# Patient Record
Sex: MALE | Race: WHITE | NOT HISPANIC OR LATINO | Employment: FULL TIME | URBAN - METROPOLITAN AREA
[De-identification: names, ages, dates, MRNs, and addresses within clinical notes are randomized per-mention and may not be internally consistent; named-entity substitution may affect disease eponyms.]

---

## 2017-05-14 ENCOUNTER — HOSPITAL ENCOUNTER (EMERGENCY)
Facility: HOSPITAL | Age: 42
Discharge: HOME | End: 2017-05-14
Attending: EMERGENCY MEDICINE | Admitting: EMERGENCY MEDICINE
Payer: COMMERCIAL

## 2017-05-14 VITALS
SYSTOLIC BLOOD PRESSURE: 149 MMHG | HEIGHT: 66 IN | TEMPERATURE: 98.4 F | BODY MASS INDEX: 38.57 KG/M2 | WEIGHT: 240 LBS | HEART RATE: 74 BPM | DIASTOLIC BLOOD PRESSURE: 85 MMHG | OXYGEN SATURATION: 97 % | RESPIRATION RATE: 18 BRPM

## 2017-05-14 DIAGNOSIS — R31.9 HEMATURIA: Primary | ICD-10-CM

## 2017-05-14 DIAGNOSIS — R39.15 URINARY URGENCY: ICD-10-CM

## 2017-05-14 LAB
BACTERIA UR QL AUTO: ABNORMAL /HPF
BILIRUB UR QL STRIP: NEGATIVE
CLARITY UR: ABNORMAL
COLOR UR: ABNORMAL
GLUCOSE UR STRIP-MCNC: ABNORMAL MG/DL
HGB UR QL STRIP.AUTO: ABNORMAL
HYALINE CASTS #/AREA URNS LPF: ABNORMAL /LPF
KETONES UR STRIP-MCNC: ABNORMAL MG/DL
LEUKOCYTE ESTERASE UR QL STRIP: NEGATIVE
NITRITE UR QL STRIP: NEGATIVE
NON-SQ EPI CELLS URNS QL MICRO: ABNORMAL /HPF
PH UR STRIP.AUTO: 6 [PH] (ref 4.5–8)
PROT UR STRIP-MCNC: >=300 MG/DL
RBC #/AREA URNS AUTO: ABNORMAL /HPF
SP GR UR STRIP.AUTO: >=1.03 (ref 1–1.03)
UROBILINOGEN UR QL STRIP.AUTO: 0.2 E.U./DL
WBC #/AREA URNS AUTO: ABNORMAL /HPF

## 2017-05-14 PROCEDURE — 99283 EMERGENCY DEPT VISIT LOW MDM: CPT

## 2017-05-14 PROCEDURE — 81001 URINALYSIS AUTO W/SCOPE: CPT

## 2017-05-14 PROCEDURE — 87086 URINE CULTURE/COLONY COUNT: CPT

## 2017-05-14 RX ORDER — SIMVASTATIN 20 MG
20 TABLET ORAL
COMMUNITY

## 2017-05-14 RX ORDER — LISINOPRIL 10 MG/1
10 TABLET ORAL DAILY
COMMUNITY

## 2017-05-16 ENCOUNTER — TRANSCRIBE ORDERS (OUTPATIENT)
Dept: ADMINISTRATIVE | Facility: HOSPITAL | Age: 42
End: 2017-05-16

## 2017-05-16 DIAGNOSIS — R31.0 GROSS HEMATURIA: Primary | ICD-10-CM

## 2017-05-16 LAB — BACTERIA UR CULT: NORMAL

## 2017-05-24 ENCOUNTER — HOSPITAL ENCOUNTER (OUTPATIENT)
Dept: CT IMAGING | Facility: HOSPITAL | Age: 42
Discharge: HOME | End: 2017-05-24
Attending: UROLOGY
Payer: COMMERCIAL

## 2017-05-24 ENCOUNTER — OFFICE VISIT (OUTPATIENT)
Dept: LAB | Facility: CLINIC | Age: 42
End: 2017-05-24
Payer: COMMERCIAL

## 2017-05-24 ENCOUNTER — TRANSCRIBE ORDERS (OUTPATIENT)
Dept: LAB | Facility: CLINIC | Age: 42
End: 2017-05-24

## 2017-05-24 DIAGNOSIS — Z01.818 PREOP EXAMINATION: ICD-10-CM

## 2017-05-24 DIAGNOSIS — R31.0 GROSS HEMATURIA: ICD-10-CM

## 2017-05-24 DIAGNOSIS — Z01.818 PREOP EXAMINATION: Primary | ICD-10-CM

## 2017-05-24 PROCEDURE — 74178 CT ABD&PLV WO CNTR FLWD CNTR: CPT

## 2017-05-24 PROCEDURE — 93005 ELECTROCARDIOGRAM TRACING: CPT

## 2017-05-24 RX ADMIN — IOHEXOL 125 ML: 350 INJECTION, SOLUTION INTRAVENOUS at 07:29

## 2017-05-25 LAB
ATRIAL RATE: 75 BPM
P AXIS: 52 DEGREES
PR INTERVAL: 142 MS
QRS AXIS: -20 DEGREES
QRSD INTERVAL: 92 MS
QT INTERVAL: 394 MS
QTC INTERVAL: 439 MS
T WAVE AXIS: 49 DEGREES
VENTRICULAR RATE: 75 BPM

## 2017-05-31 ENCOUNTER — ANESTHESIA EVENT (OUTPATIENT)
Dept: PERIOP | Facility: HOSPITAL | Age: 42
End: 2017-05-31
Payer: COMMERCIAL

## 2017-06-01 ENCOUNTER — ANESTHESIA (OUTPATIENT)
Dept: PERIOP | Facility: HOSPITAL | Age: 42
End: 2017-06-01
Payer: COMMERCIAL

## 2017-06-01 ENCOUNTER — HOSPITAL ENCOUNTER (OUTPATIENT)
Facility: HOSPITAL | Age: 42
Setting detail: OUTPATIENT SURGERY
Discharge: HOME | End: 2017-06-01
Attending: UROLOGY | Admitting: UROLOGY
Payer: COMMERCIAL

## 2017-06-01 ENCOUNTER — APPOINTMENT (OUTPATIENT)
Dept: RADIOLOGY | Facility: HOSPITAL | Age: 42
End: 2017-06-01
Payer: COMMERCIAL

## 2017-06-01 VITALS
OXYGEN SATURATION: 94 % | BODY MASS INDEX: 35.77 KG/M2 | TEMPERATURE: 98.5 F | HEIGHT: 68 IN | HEART RATE: 79 BPM | WEIGHT: 236 LBS | SYSTOLIC BLOOD PRESSURE: 166 MMHG | DIASTOLIC BLOOD PRESSURE: 94 MMHG | RESPIRATION RATE: 16 BRPM

## 2017-06-01 DIAGNOSIS — R31.0 GROSS HEMATURIA: ICD-10-CM

## 2017-06-01 DIAGNOSIS — N35.919 URETHRAL STRICTURE: ICD-10-CM

## 2017-06-01 LAB
GLUCOSE SERPL-MCNC: 233 MG/DL (ref 65–140)
GLUCOSE SERPL-MCNC: 257 MG/DL (ref 65–140)
GLUCOSE SERPL-MCNC: 264 MG/DL (ref 65–140)

## 2017-06-01 PROCEDURE — 87077 CULTURE AEROBIC IDENTIFY: CPT | Performed by: UROLOGY

## 2017-06-01 PROCEDURE — 87086 URINE CULTURE/COLONY COUNT: CPT | Performed by: UROLOGY

## 2017-06-01 PROCEDURE — C1769 GUIDE WIRE: HCPCS | Performed by: UROLOGY

## 2017-06-01 PROCEDURE — 87186 SC STD MICRODIL/AGAR DIL: CPT | Performed by: UROLOGY

## 2017-06-01 PROCEDURE — 82948 REAGENT STRIP/BLOOD GLUCOSE: CPT

## 2017-06-01 PROCEDURE — 74420 UROGRAPHY RTRGR +-KUB: CPT

## 2017-06-01 RX ORDER — MIDAZOLAM HYDROCHLORIDE 1 MG/ML
INJECTION INTRAMUSCULAR; INTRAVENOUS
Status: DISCONTINUED
Start: 2017-06-01 | End: 2017-06-01 | Stop reason: HOSPADM

## 2017-06-01 RX ORDER — MIDAZOLAM HYDROCHLORIDE 1 MG/ML
INJECTION INTRAMUSCULAR; INTRAVENOUS AS NEEDED
Status: DISCONTINUED | OUTPATIENT
Start: 2017-06-01 | End: 2017-06-01 | Stop reason: SURG

## 2017-06-01 RX ORDER — IBUPROFEN 200 MG
1 TABLET ORAL 2 TIMES DAILY
Qty: 28.3 G | Refills: 0 | Status: SHIPPED | OUTPATIENT
Start: 2017-06-01 | End: 2018-09-18

## 2017-06-01 RX ORDER — LABETALOL HYDROCHLORIDE 5 MG/ML
INJECTION, SOLUTION INTRAVENOUS AS NEEDED
Status: DISCONTINUED | OUTPATIENT
Start: 2017-06-01 | End: 2017-06-01 | Stop reason: SURG

## 2017-06-01 RX ORDER — ONDANSETRON 2 MG/ML
INJECTION INTRAMUSCULAR; INTRAVENOUS AS NEEDED
Status: DISCONTINUED | OUTPATIENT
Start: 2017-06-01 | End: 2017-06-01 | Stop reason: SURG

## 2017-06-01 RX ORDER — FENTANYL CITRATE 50 UG/ML
INJECTION, SOLUTION INTRAMUSCULAR; INTRAVENOUS AS NEEDED
Status: DISCONTINUED | OUTPATIENT
Start: 2017-06-01 | End: 2017-06-01 | Stop reason: SURG

## 2017-06-01 RX ORDER — SODIUM CHLORIDE, SODIUM LACTATE, POTASSIUM CHLORIDE, CALCIUM CHLORIDE 600; 310; 30; 20 MG/100ML; MG/100ML; MG/100ML; MG/100ML
20 INJECTION, SOLUTION INTRAVENOUS CONTINUOUS
Status: DISCONTINUED | OUTPATIENT
Start: 2017-06-01 | End: 2017-06-01 | Stop reason: HOSPADM

## 2017-06-01 RX ORDER — PROPOFOL 10 MG/ML
INJECTION, EMULSION INTRAVENOUS AS NEEDED
Status: DISCONTINUED | OUTPATIENT
Start: 2017-06-01 | End: 2017-06-01 | Stop reason: SURG

## 2017-06-01 RX ORDER — CEFUROXIME AXETIL 500 MG/1
500 TABLET ORAL EVERY 12 HOURS SCHEDULED
Qty: 6 TABLET | Refills: 0 | Status: SHIPPED | OUTPATIENT
Start: 2017-06-01 | End: 2017-06-04

## 2017-06-01 RX ORDER — FENTANYL CITRATE/PF 50 MCG/ML
50 SYRINGE (ML) INJECTION
Status: DISCONTINUED | OUTPATIENT
Start: 2017-06-01 | End: 2017-06-01 | Stop reason: HOSPADM

## 2017-06-01 RX ORDER — LIDOCAINE HYDROCHLORIDE 10 MG/ML
INJECTION, SOLUTION INFILTRATION; PERINEURAL AS NEEDED
Status: DISCONTINUED | OUTPATIENT
Start: 2017-06-01 | End: 2017-06-01 | Stop reason: SURG

## 2017-06-01 RX ORDER — SODIUM CHLORIDE, SODIUM LACTATE, POTASSIUM CHLORIDE, CALCIUM CHLORIDE 600; 310; 30; 20 MG/100ML; MG/100ML; MG/100ML; MG/100ML
125 INJECTION, SOLUTION INTRAVENOUS CONTINUOUS
Status: DISCONTINUED | OUTPATIENT
Start: 2017-06-01 | End: 2017-06-01 | Stop reason: HOSPADM

## 2017-06-01 RX ORDER — OXYCODONE HYDROCHLORIDE AND ACETAMINOPHEN 5; 325 MG/1; MG/1
1 TABLET ORAL EVERY 4 HOURS PRN
Qty: 12 TABLET | Refills: 0 | Status: SHIPPED | OUTPATIENT
Start: 2017-06-01 | End: 2017-06-06

## 2017-06-01 RX ORDER — ONDANSETRON 2 MG/ML
4 INJECTION INTRAMUSCULAR; INTRAVENOUS ONCE
Status: DISCONTINUED | OUTPATIENT
Start: 2017-06-01 | End: 2017-06-01 | Stop reason: HOSPADM

## 2017-06-01 RX ADMIN — FENTANYL CITRATE 50 MCG: 50 INJECTION, SOLUTION INTRAMUSCULAR; INTRAVENOUS at 12:12

## 2017-06-01 RX ADMIN — SODIUM CHLORIDE, SODIUM LACTATE, POTASSIUM CHLORIDE, AND CALCIUM CHLORIDE 20 ML/HR: .6; .31; .03; .02 INJECTION, SOLUTION INTRAVENOUS at 11:13

## 2017-06-01 RX ADMIN — FENTANYL CITRATE 50 MCG: 50 INJECTION, SOLUTION INTRAMUSCULAR; INTRAVENOUS at 12:20

## 2017-06-01 RX ADMIN — CEFAZOLIN SODIUM 2000 MG: 2 SOLUTION INTRAVENOUS at 12:08

## 2017-06-01 RX ADMIN — LABETALOL HYDROCHLORIDE 5 MG: 5 INJECTION, SOLUTION INTRAVENOUS at 12:26

## 2017-06-01 RX ADMIN — LIDOCAINE HYDROCHLORIDE 50 MG: 10 INJECTION, SOLUTION INFILTRATION; PERINEURAL at 12:12

## 2017-06-01 RX ADMIN — ONDANSETRON 4 MG: 2 INJECTION INTRAMUSCULAR; INTRAVENOUS at 12:20

## 2017-06-01 RX ADMIN — PROPOFOL 200 MG: 10 INJECTION, EMULSION INTRAVENOUS at 12:12

## 2017-06-01 RX ADMIN — MIDAZOLAM HYDROCHLORIDE 2 MG: 1 INJECTION, SOLUTION INTRAMUSCULAR; INTRAVENOUS at 12:06

## 2017-06-03 LAB
BACTERIA UR CULT: NORMAL
BACTERIA UR CULT: NORMAL
GRAM STN SPEC: NORMAL

## 2017-06-09 ENCOUNTER — HOSPITAL ENCOUNTER (EMERGENCY)
Facility: HOSPITAL | Age: 42
Discharge: HOME | End: 2017-06-10
Attending: EMERGENCY MEDICINE
Payer: COMMERCIAL

## 2017-06-09 ENCOUNTER — APPOINTMENT (EMERGENCY)
Dept: RADIOLOGY | Facility: HOSPITAL | Age: 42
End: 2017-06-09
Payer: COMMERCIAL

## 2017-06-09 VITALS
RESPIRATION RATE: 18 BRPM | DIASTOLIC BLOOD PRESSURE: 86 MMHG | OXYGEN SATURATION: 98 % | TEMPERATURE: 98.8 F | HEART RATE: 90 BPM | WEIGHT: 226.85 LBS | SYSTOLIC BLOOD PRESSURE: 132 MMHG | BODY MASS INDEX: 34.49 KG/M2

## 2017-06-09 DIAGNOSIS — R50.9 FEVER: ICD-10-CM

## 2017-06-09 DIAGNOSIS — N39.0 UTI (URINARY TRACT INFECTION): Primary | ICD-10-CM

## 2017-06-09 LAB
ALBUMIN SERPL BCP-MCNC: 3.9 G/DL (ref 3.5–5)
ALP SERPL-CCNC: 99 U/L (ref 46–116)
ALT SERPL W P-5'-P-CCNC: 27 U/L (ref 12–78)
ANION GAP SERPL CALCULATED.3IONS-SCNC: 8 MMOL/L (ref 4–13)
APTT PPP: 27 SECONDS (ref 23–35)
AST SERPL W P-5'-P-CCNC: 13 U/L (ref 5–45)
BACTERIA UR QL AUTO: ABNORMAL /HPF
BASOPHILS # BLD AUTO: 0.03 THOUSANDS/ΜL (ref 0–0.1)
BASOPHILS NFR BLD AUTO: 0 % (ref 0–1)
BILIRUB SERPL-MCNC: 0.4 MG/DL (ref 0.2–1)
BILIRUB UR QL STRIP: NEGATIVE
BUN SERPL-MCNC: 10 MG/DL (ref 5–25)
CALCIUM SERPL-MCNC: 8.7 MG/DL (ref 8.3–10.1)
CHLORIDE SERPL-SCNC: 100 MMOL/L (ref 100–108)
CLARITY UR: ABNORMAL
CO2 SERPL-SCNC: 27 MMOL/L (ref 21–32)
COLOR UR: YELLOW
CREAT SERPL-MCNC: 0.88 MG/DL (ref 0.6–1.3)
EOSINOPHIL # BLD AUTO: 0.19 THOUSAND/ΜL (ref 0–0.61)
EOSINOPHIL NFR BLD AUTO: 1 % (ref 0–6)
ERYTHROCYTE [DISTWIDTH] IN BLOOD BY AUTOMATED COUNT: 12.9 % (ref 11.6–15.1)
GFR SERPL CREATININE-BSD FRML MDRD: >60 ML/MIN/1.73SQ M
GLUCOSE SERPL-MCNC: 257 MG/DL (ref 65–140)
GLUCOSE UR STRIP-MCNC: NEGATIVE MG/DL
HCT VFR BLD AUTO: 43 % (ref 36.5–49.3)
HGB BLD-MCNC: 15.1 G/DL (ref 12–17)
HGB UR QL STRIP.AUTO: ABNORMAL
INR PPP: 0.96 (ref 0.86–1.16)
KETONES UR STRIP-MCNC: ABNORMAL MG/DL
LEUKOCYTE ESTERASE UR QL STRIP: ABNORMAL
LYMPHOCYTES # BLD AUTO: 1.92 THOUSANDS/ΜL (ref 0.6–4.47)
LYMPHOCYTES NFR BLD AUTO: 12 % (ref 14–44)
MCH RBC QN AUTO: 28.1 PG (ref 26.8–34.3)
MCHC RBC AUTO-ENTMCNC: 35.1 G/DL (ref 31.4–37.4)
MCV RBC AUTO: 80 FL (ref 82–98)
MONOCYTES # BLD AUTO: 1.22 THOUSAND/ΜL (ref 0.17–1.22)
MONOCYTES NFR BLD AUTO: 8 % (ref 4–12)
NEUTROPHILS # BLD AUTO: 12.79 THOUSANDS/ΜL (ref 1.85–7.62)
NEUTS SEG NFR BLD AUTO: 79 % (ref 43–75)
NITRITE UR QL STRIP: NEGATIVE
NON-SQ EPI CELLS URNS QL MICRO: ABNORMAL /HPF
OTHER STN SPEC: ABNORMAL
PH UR STRIP.AUTO: 5.5 [PH] (ref 4.5–8)
PLATELET # BLD AUTO: 214 THOUSANDS/UL (ref 149–390)
PMV BLD AUTO: 11.5 FL (ref 8.9–12.7)
POTASSIUM SERPL-SCNC: 4.4 MMOL/L (ref 3.5–5.3)
PROT SERPL-MCNC: 7.6 G/DL (ref 6.4–8.2)
PROT UR STRIP-MCNC: ABNORMAL MG/DL
PROTHROMBIN TIME: 13.1 SECONDS (ref 12.1–14.4)
RBC # BLD AUTO: 5.38 MILLION/UL (ref 3.88–5.62)
RBC #/AREA URNS AUTO: ABNORMAL /HPF
SODIUM SERPL-SCNC: 135 MMOL/L (ref 136–145)
SP GR UR STRIP.AUTO: 1.02 (ref 1–1.03)
UROBILINOGEN UR QL STRIP.AUTO: 0.2 E.U./DL
WBC # BLD AUTO: 16.15 THOUSAND/UL (ref 4.31–10.16)
WBC #/AREA URNS AUTO: ABNORMAL /HPF

## 2017-06-09 PROCEDURE — 87077 CULTURE AEROBIC IDENTIFY: CPT | Performed by: EMERGENCY MEDICINE

## 2017-06-09 PROCEDURE — 81001 URINALYSIS AUTO W/SCOPE: CPT | Performed by: EMERGENCY MEDICINE

## 2017-06-09 PROCEDURE — 87040 BLOOD CULTURE FOR BACTERIA: CPT | Performed by: EMERGENCY MEDICINE

## 2017-06-09 PROCEDURE — 36415 COLL VENOUS BLD VENIPUNCTURE: CPT | Performed by: EMERGENCY MEDICINE

## 2017-06-09 PROCEDURE — 85610 PROTHROMBIN TIME: CPT | Performed by: EMERGENCY MEDICINE

## 2017-06-09 PROCEDURE — 85025 COMPLETE CBC W/AUTO DIFF WBC: CPT | Performed by: EMERGENCY MEDICINE

## 2017-06-09 PROCEDURE — 96365 THER/PROPH/DIAG IV INF INIT: CPT

## 2017-06-09 PROCEDURE — 87086 URINE CULTURE/COLONY COUNT: CPT | Performed by: EMERGENCY MEDICINE

## 2017-06-09 PROCEDURE — 87186 SC STD MICRODIL/AGAR DIL: CPT | Performed by: EMERGENCY MEDICINE

## 2017-06-09 PROCEDURE — 71020 HB CHEST X-RAY 2VW FRONTAL&LATL: CPT

## 2017-06-09 PROCEDURE — 85730 THROMBOPLASTIN TIME PARTIAL: CPT | Performed by: EMERGENCY MEDICINE

## 2017-06-09 PROCEDURE — 80053 COMPREHEN METABOLIC PANEL: CPT | Performed by: EMERGENCY MEDICINE

## 2017-06-09 RX ADMIN — CEFTRIAXONE SODIUM 1000 MG: 10 INJECTION, POWDER, FOR SOLUTION INTRAVENOUS at 23:13

## 2017-06-09 RX ADMIN — SODIUM CHLORIDE 500 ML: 0.9 INJECTION, SOLUTION INTRAVENOUS at 23:12

## 2017-06-10 PROCEDURE — 99284 EMERGENCY DEPT VISIT MOD MDM: CPT

## 2017-06-10 RX ORDER — CEPHALEXIN 500 MG/1
500 CAPSULE ORAL 4 TIMES DAILY
Qty: 40 CAPSULE | Refills: 0 | Status: SHIPPED | OUTPATIENT
Start: 2017-06-10 | End: 2017-06-20

## 2017-06-12 LAB — BACTERIA UR CULT: NORMAL

## 2017-06-15 LAB
BACTERIA BLD CULT: NORMAL
BACTERIA BLD CULT: NORMAL

## 2018-09-06 ENCOUNTER — OFFICE VISIT (OUTPATIENT)
Dept: UROLOGY | Facility: CLINIC | Age: 43
End: 2018-09-06
Payer: COMMERCIAL

## 2018-09-06 VITALS
HEART RATE: 72 BPM | BODY MASS INDEX: 33.8 KG/M2 | HEIGHT: 68 IN | WEIGHT: 223 LBS | SYSTOLIC BLOOD PRESSURE: 130 MMHG | DIASTOLIC BLOOD PRESSURE: 84 MMHG

## 2018-09-06 DIAGNOSIS — N39.0 URINARY TRACT INFECTION WITHOUT HEMATURIA, SITE UNSPECIFIED: ICD-10-CM

## 2018-09-06 DIAGNOSIS — N35.9 URETHRAL STRICTURE, UNSPECIFIED STRICTURE TYPE: Primary | ICD-10-CM

## 2018-09-06 LAB
POST-VOID RESIDUAL VOLUME, ML POC: 2 ML
SL AMB  POCT GLUCOSE, UA: ABNORMAL
SL AMB LEUKOCYTE ESTERASE,UA: ABNORMAL
SL AMB POCT BILIRUBIN,UA: ABNORMAL
SL AMB POCT BLOOD,UA: ABNORMAL
SL AMB POCT CLARITY,UA: CLEAR
SL AMB POCT COLOR,UA: YELLOW
SL AMB POCT KETONES,UA: ABNORMAL
SL AMB POCT NITRITE,UA: ABNORMAL
SL AMB POCT PH,UA: 5
SL AMB POCT SPECIFIC GRAVITY,UA: 1.01
SL AMB POCT URINE PROTEIN: ABNORMAL
SL AMB POCT UROBILINOGEN: ABNORMAL

## 2018-09-06 PROCEDURE — 99213 OFFICE O/P EST LOW 20 MIN: CPT | Performed by: PHYSICIAN ASSISTANT

## 2018-09-06 PROCEDURE — 81002 URINALYSIS NONAUTO W/O SCOPE: CPT | Performed by: PHYSICIAN ASSISTANT

## 2018-09-06 PROCEDURE — 51798 US URINE CAPACITY MEASURE: CPT | Performed by: PHYSICIAN ASSISTANT

## 2018-09-06 RX ORDER — CIPROFLOXACIN 500 MG/1
500 TABLET, FILM COATED ORAL EVERY 12 HOURS SCHEDULED
Qty: 14 TABLET | Refills: 0 | Status: SHIPPED | OUTPATIENT
Start: 2018-09-06 | End: 2018-09-13

## 2018-09-06 NOTE — PROGRESS NOTES
UROLOGY PROGRESS NOTE   Patient Identifiers: Gonzalez Hemphill (MRN [id number])  Date of Service: 9/6/2018    Subjective:    80-year-old male with history of urethral stricture  He had a DVIU in June Of 2017  Unfortunately was unable to follow-up he had been doing daily intermittent catheterization and recently developed some difficulty passing the catheter  He also had trouble with some scratchy burning feeling when he inserted the catheter and came in for follow-up  Dip urine today does show leukocytes  Postvoid residual was 2 mL  No hematuria      Patient has    See above      Objective:     VITALS:    Vitals:    09/06/18 1144   BP: 130/84   Pulse: 72           LABS:  Lab Results   Component Value Date    HGB 15 1 06/09/2017    HCT 43 0 06/09/2017    WBC 16 15 (H) 06/09/2017     06/09/2017   ]    Lab Results   Component Value Date     (L) 06/09/2017    K 4 4 06/09/2017     06/09/2017    CO2 27 06/09/2017    BUN 10 06/09/2017    CREATININE 0 88 06/09/2017    CALCIUM 8 7 06/09/2017   ]        INPATIENT MEDS:    Current Outpatient Prescriptions:     lisinopril (ZESTRIL) 10 mg tablet, Take 10 mg by mouth daily, Disp: , Rfl:     metFORMIN (GLUCOPHAGE) 1000 MG tablet, Take 1,000 mg by mouth 2 (two) times a day with meals, Disp: , Rfl:     simvastatin (ZOCOR) 20 mg tablet, Take 20 mg by mouth daily at bedtime, Disp: , Rfl:     ciprofloxacin (CIPRO) 500 mg tablet, Take 1 tablet (500 mg total) by mouth every 12 (twelve) hours for 7 days, Disp: 14 tablet, Rfl: 0    neomycin-bacitracin-polymyxin (NEOSPORIN) 5-400-5,000 ointment, Apply 1 application topically 2 (two) times a day (Patient not taking: Reported on 9/6/2018 ), Disp: 28 3 g, Rfl: 0      Physical Exam:   /84 (Patient Position: Sitting, Cuff Size: Adult)   Pulse 72   Ht 5' 7 5" (1 715 m)   Wt 101 kg (223 lb)   BMI 34 41 kg/m²   GEN: no acute distress    RESP: breathing comfortably with no accessory muscle use    ABD: soft, non-tender, non-distended   INCISION:    EXT: no significant peripheral edema     RADIOLOGY:    none     Assessment:    1  Urinary tract infection   2  Urethral stricture     Plan:   - there was not enough urine for a culture  - I put him on Cipro 500 mg twice a day for 7 days  - follow-up in 10 days time  - will scheduled either office cystoscopy or DVIU under anesthesia    -  He will continue daily intermittent catheterization

## 2018-09-17 ENCOUNTER — PREP FOR PROCEDURE (OUTPATIENT)
Dept: UROLOGY | Facility: CLINIC | Age: 43
End: 2018-09-17

## 2018-09-17 ENCOUNTER — OFFICE VISIT (OUTPATIENT)
Dept: UROLOGY | Facility: CLINIC | Age: 43
End: 2018-09-17
Payer: COMMERCIAL

## 2018-09-17 VITALS
DIASTOLIC BLOOD PRESSURE: 60 MMHG | SYSTOLIC BLOOD PRESSURE: 122 MMHG | BODY MASS INDEX: 34.1 KG/M2 | HEIGHT: 68 IN | WEIGHT: 225 LBS

## 2018-09-17 DIAGNOSIS — Z01.818 PREOP EXAMINATION: Primary | ICD-10-CM

## 2018-09-17 DIAGNOSIS — N35.9 URETHRAL STRICTURE, UNSPECIFIED STRICTURE TYPE: Primary | ICD-10-CM

## 2018-09-17 PROBLEM — N35.919 URETHRAL STRICTURE: Status: ACTIVE | Noted: 2018-09-17

## 2018-09-17 LAB
SL AMB  POCT GLUCOSE, UA: NORMAL
SL AMB LEUKOCYTE ESTERASE,UA: NORMAL
SL AMB POCT BILIRUBIN,UA: NORMAL
SL AMB POCT BLOOD,UA: NORMAL
SL AMB POCT CLARITY,UA: CLEAR
SL AMB POCT COLOR,UA: YELLOW
SL AMB POCT KETONES,UA: NORMAL
SL AMB POCT NITRITE,UA: NORMAL
SL AMB POCT PH,UA: 5
SL AMB POCT SPECIFIC GRAVITY,UA: 1.03
SL AMB POCT URINE PROTEIN: NORMAL
SL AMB POCT UROBILINOGEN: NORMAL

## 2018-09-17 PROCEDURE — 81002 URINALYSIS NONAUTO W/O SCOPE: CPT | Performed by: PHYSICIAN ASSISTANT

## 2018-09-17 PROCEDURE — 99213 OFFICE O/P EST LOW 20 MIN: CPT | Performed by: PHYSICIAN ASSISTANT

## 2018-09-17 NOTE — PROGRESS NOTES
UROLOGY PROGRESS NOTE   Patient Identifiers: Sampson Chavez (MRN [id number])  Date of Service: 9/17/2018    Subjective:     49-year-old male with history of urethral stricture  He had a DVIU in June of 2017 and has been doing intermittent catheterization  He was unable to follow-up due to insurance  He was complaining of scratching feeling we inserted the catheter  I treated him with ciprofloxacin  His urine is now clear and he is much more comfortable although he does have occasional difficulty with the catheter  Patient has    See above      Objective:     VITALS:    Vitals:    09/17/18 0902   BP: 122/60           LABS:  Lab Results   Component Value Date    HGB 15 1 06/09/2017    HCT 43 0 06/09/2017    WBC 16 15 (H) 06/09/2017     06/09/2017   ]    Lab Results   Component Value Date     (L) 06/09/2017    K 4 4 06/09/2017     06/09/2017    CO2 27 06/09/2017    BUN 10 06/09/2017    CREATININE 0 88 06/09/2017    CALCIUM 8 7 06/09/2017   ]        INPATIENT MEDS:    Current Outpatient Prescriptions:     lisinopril (ZESTRIL) 10 mg tablet, Take 10 mg by mouth daily, Disp: , Rfl:     metFORMIN (GLUCOPHAGE) 1000 MG tablet, Take 1,000 mg by mouth 2 (two) times a day with meals, Disp: , Rfl:     simvastatin (ZOCOR) 20 mg tablet, Take 20 mg by mouth daily at bedtime, Disp: , Rfl:     neomycin-bacitracin-polymyxin (NEOSPORIN) 5-400-5,000 ointment, Apply 1 application topically 2 (two) times a day (Patient not taking: Reported on 9/6/2018 ), Disp: 28 3 g, Rfl: 0      Physical Exam:   /60 (BP Location: Right arm, Patient Position: Sitting, Cuff Size: Adult)   Ht 5' 7 5" (1 715 m)   Wt 102 kg (225 lb)   BMI 34 72 kg/m²   GEN: no acute distress    RESP: breathing comfortably with no accessory muscle use    ABD: soft, non-tender, non-distended   INCISION:    EXT: no significant peripheral edema     RADIOLOGY:    none     Assessment:    1  Urethral stricture   2   Urinary tract infection -resolved     Plan:   - schedule DVIU under anesthesia  -  -  -

## 2018-09-18 NOTE — PRE-PROCEDURE INSTRUCTIONS
Pre-Surgery Instructions:   Medication Instructions    lisinopril (ZESTRIL) 10 mg tablet Instructed patient per Anesthesia Guidelines   metFORMIN (GLUCOPHAGE) 1000 MG tablet Instructed patient per Anesthesia Guidelines   simvastatin (ZOCOR) 20 mg tablet Instructed patient per Anesthesia Guidelines  ACE/ARB Med Class     Do not take this medication the day before and the morning of the day of surgery/procedure  Insulin Med Class     Pre-Surgery/Procedure Instructions for Adult Patients who Take Medicine for Diabetes or to Control their Blood Sugar     Day Before Surgery/Procedure  Use the directions based on the type of medicine you take for your diabetes  1  If you are having a procedure that does not require a bowel prep:  ? Pre-Mixed Insulin (Intermediate Acting: Humalog 75/25, Humulin 70/30  Novolog 70/30, Regular Insulin)  § Take ½ your regular dose the evening before your procedure  ? Rapid/Fast Acting Insulin/Long Acting Insulin (Humalog U200, NovoLog, Apidra, Lantus, Levemir, Hildy Filbert, [place])  § Take your FULL regular dose the day before procedure  ? Oral Diabetic Medicines including Glipizide/Glimepiride/Glucotrol (sulfonylurea)  § Take your regular dose with dinner the evening before your procedure  2  If you are having a procedure (e g  Colonoscopy) that requires a bowel prep and you are allowed to have at least a clear liquid diet:  ? Pre-Mixed Insulin (Intermediate Acting: Humalog 75/25, Humulin 70/30, Novolog 70/30, Regular Insulin)  § Take ½ your regular dose the evening before your procedure  ? Rapid/Fast Acting Insulin (Humalog U200, NovoLog, Apidra, [place])  § Take ½ your regular dose the evening before your procedure  ? Long Acting Insulin (Lantus, Levemir, Hildy Filbert)  § Take your FULL regular dose the day before procedure  ? Oral Glipizide/Glimepiride/Glucotrol (sulfonylurea)  § Take ½ your regular dose the evening before your procedure    ? Oral Diabetic Medicines that are NOT Glipizide/Glimepiride/Glucotrol  § Take your regular dose with dinner in the evening before your procedure      Day of Surgery/Procedure  · Long Acting Insulin (Lantus, Levemir, [address])  ? If you usually take your Long-Acting Insulin in the morning, take the full dose as scheduled  · With the exception of the morning Long-Acting Insulin noted above, DO NOT take ANY diabetic medicine on the day of your procedure unless you were instructed by the doctor who manages your diabetic medicines  · Continue to check your blood sugars  · If you have an insulin pump then consult with your Endocrinologist for instructions  · If you cannot see your Endocrinologist, on the day of the procedure set your insulin pump to your basal rate only  Please bring your insulin pump supplies to the hospital      This Educational material has been approved by the Patient Education Advisory Committee  Date prepared: 1/17/2018          Expiration date: 1/17/2019        Approval Number:      Pre op instructions reviewed with wife on 9/18    Meds bathing and hospital instructions reviewed at this time with understanding

## 2018-09-20 ENCOUNTER — TRANSCRIBE ORDERS (OUTPATIENT)
Dept: LAB | Facility: CLINIC | Age: 43
End: 2018-09-20

## 2018-09-20 ENCOUNTER — APPOINTMENT (OUTPATIENT)
Dept: LAB | Facility: CLINIC | Age: 43
End: 2018-09-20
Payer: COMMERCIAL

## 2018-09-20 ENCOUNTER — OFFICE VISIT (OUTPATIENT)
Dept: LAB | Facility: CLINIC | Age: 43
End: 2018-09-20
Payer: COMMERCIAL

## 2018-09-20 DIAGNOSIS — Z01.818 PREOP EXAMINATION: ICD-10-CM

## 2018-09-20 DIAGNOSIS — Z01.818 PREOP EXAMINATION: Primary | ICD-10-CM

## 2018-09-20 LAB
ABO GROUP BLD: NORMAL
ATRIAL RATE: 0 BPM
ATRIAL RATE: 72 BPM
BLD GP AB SCN SERPL QL: NEGATIVE
HBA1C MFR BLD HPLC: 7.3 %
P AXIS: 57 DEGREES
PR INTERVAL: 144 MS
QRS AXIS: -21 DEGREES
QRS AXIS: 0 DEGREES
QRSD INTERVAL: 0 MS
QRSD INTERVAL: 90 MS
QT INTERVAL: 0 MS
QT INTERVAL: 406 MS
QTC INTERVAL: 0 MS
QTC INTERVAL: 444 MS
RH BLD: NEGATIVE
SPECIMEN EXPIRATION DATE: NORMAL
T WAVE AXIS: 0 DEGREES
T WAVE AXIS: 62 DEGREES
VENTRICULAR RATE: 0 BPM
VENTRICULAR RATE: 72 BPM

## 2018-09-20 PROCEDURE — 86900 BLOOD TYPING SEROLOGIC ABO: CPT

## 2018-09-20 PROCEDURE — 93010 ELECTROCARDIOGRAM REPORT: CPT | Performed by: INTERNAL MEDICINE

## 2018-09-20 PROCEDURE — 86850 RBC ANTIBODY SCREEN: CPT

## 2018-09-20 PROCEDURE — 93005 ELECTROCARDIOGRAM TRACING: CPT

## 2018-09-20 PROCEDURE — 36415 COLL VENOUS BLD VENIPUNCTURE: CPT

## 2018-09-20 PROCEDURE — 86901 BLOOD TYPING SEROLOGIC RH(D): CPT

## 2018-09-27 ENCOUNTER — ANESTHESIA EVENT (OUTPATIENT)
Dept: PERIOP | Facility: HOSPITAL | Age: 43
End: 2018-09-27
Payer: COMMERCIAL

## 2018-09-27 ENCOUNTER — ANESTHESIA (OUTPATIENT)
Dept: PERIOP | Facility: HOSPITAL | Age: 43
End: 2018-09-27
Payer: COMMERCIAL

## 2018-09-27 ENCOUNTER — HOSPITAL ENCOUNTER (OUTPATIENT)
Facility: HOSPITAL | Age: 43
Setting detail: OUTPATIENT SURGERY
Discharge: HOME | End: 2018-09-27
Attending: UROLOGY | Admitting: UROLOGY
Payer: COMMERCIAL

## 2018-09-27 VITALS
HEART RATE: 78 BPM | BODY MASS INDEX: 35.84 KG/M2 | TEMPERATURE: 98.2 F | HEIGHT: 66 IN | OXYGEN SATURATION: 98 % | SYSTOLIC BLOOD PRESSURE: 136 MMHG | WEIGHT: 223 LBS | DIASTOLIC BLOOD PRESSURE: 77 MMHG | RESPIRATION RATE: 18 BRPM

## 2018-09-27 DIAGNOSIS — Z01.818 PREOP EXAMINATION: ICD-10-CM

## 2018-09-27 DIAGNOSIS — N35.9 URETHRAL STRICTURE, UNSPECIFIED STRICTURE TYPE: ICD-10-CM

## 2018-09-27 LAB
GLUCOSE SERPL-MCNC: 128 MG/DL (ref 65–140)
GLUCOSE SERPL-MCNC: 135 MG/DL (ref 65–140)

## 2018-09-27 PROCEDURE — 52276 CYSTOSCOPY AND TREATMENT: CPT | Performed by: UROLOGY

## 2018-09-27 PROCEDURE — 82948 REAGENT STRIP/BLOOD GLUCOSE: CPT

## 2018-09-27 PROCEDURE — 87086 URINE CULTURE/COLONY COUNT: CPT | Performed by: UROLOGY

## 2018-09-27 PROCEDURE — C1769 GUIDE WIRE: HCPCS | Performed by: UROLOGY

## 2018-09-27 RX ORDER — SODIUM CHLORIDE, SODIUM LACTATE, POTASSIUM CHLORIDE, CALCIUM CHLORIDE 600; 310; 30; 20 MG/100ML; MG/100ML; MG/100ML; MG/100ML
200 INJECTION, SOLUTION INTRAVENOUS CONTINUOUS
Status: DISCONTINUED | OUTPATIENT
Start: 2018-09-27 | End: 2018-09-27 | Stop reason: HOSPADM

## 2018-09-27 RX ORDER — SODIUM CHLORIDE, SODIUM LACTATE, POTASSIUM CHLORIDE, CALCIUM CHLORIDE 600; 310; 30; 20 MG/100ML; MG/100ML; MG/100ML; MG/100ML
125 INJECTION, SOLUTION INTRAVENOUS CONTINUOUS
Status: DISCONTINUED | OUTPATIENT
Start: 2018-09-27 | End: 2018-09-27 | Stop reason: HOSPADM

## 2018-09-27 RX ORDER — FENTANYL CITRATE/PF 50 MCG/ML
25 SYRINGE (ML) INJECTION
Status: DISCONTINUED | OUTPATIENT
Start: 2018-09-27 | End: 2018-09-27 | Stop reason: HOSPADM

## 2018-09-27 RX ORDER — FENTANYL CITRATE 50 UG/ML
INJECTION, SOLUTION INTRAMUSCULAR; INTRAVENOUS AS NEEDED
Status: DISCONTINUED | OUTPATIENT
Start: 2018-09-27 | End: 2018-09-27 | Stop reason: SURG

## 2018-09-27 RX ORDER — ONDANSETRON 2 MG/ML
INJECTION INTRAMUSCULAR; INTRAVENOUS AS NEEDED
Status: DISCONTINUED | OUTPATIENT
Start: 2018-09-27 | End: 2018-09-27 | Stop reason: SURG

## 2018-09-27 RX ORDER — MIDAZOLAM HYDROCHLORIDE 1 MG/ML
INJECTION INTRAMUSCULAR; INTRAVENOUS AS NEEDED
Status: DISCONTINUED | OUTPATIENT
Start: 2018-09-27 | End: 2018-09-27 | Stop reason: SURG

## 2018-09-27 RX ORDER — MAGNESIUM HYDROXIDE 1200 MG/15ML
LIQUID ORAL AS NEEDED
Status: DISCONTINUED | OUTPATIENT
Start: 2018-09-27 | End: 2018-09-27 | Stop reason: HOSPADM

## 2018-09-27 RX ORDER — PROPOFOL 10 MG/ML
INJECTION, EMULSION INTRAVENOUS AS NEEDED
Status: DISCONTINUED | OUTPATIENT
Start: 2018-09-27 | End: 2018-09-27 | Stop reason: SURG

## 2018-09-27 RX ORDER — OXYCODONE HYDROCHLORIDE AND ACETAMINOPHEN 5; 325 MG/1; MG/1
1 TABLET ORAL EVERY 4 HOURS PRN
Status: DISCONTINUED | OUTPATIENT
Start: 2018-09-27 | End: 2018-09-27 | Stop reason: HOSPADM

## 2018-09-27 RX ORDER — ONDANSETRON 2 MG/ML
4 INJECTION INTRAMUSCULAR; INTRAVENOUS ONCE AS NEEDED
Status: DISCONTINUED | OUTPATIENT
Start: 2018-09-27 | End: 2018-09-27 | Stop reason: HOSPADM

## 2018-09-27 RX ORDER — TRAMADOL HYDROCHLORIDE 50 MG/1
50 TABLET ORAL EVERY 6 HOURS PRN
Qty: 10 TABLET | Refills: 0 | Status: SHIPPED | OUTPATIENT
Start: 2018-09-27

## 2018-09-27 RX ADMIN — DEXAMETHASONE SODIUM PHOSPHATE 5 MG: 10 INJECTION INTRAMUSCULAR; INTRAVENOUS at 11:11

## 2018-09-27 RX ADMIN — LIDOCAINE HYDROCHLORIDE 80 MG: 20 INJECTION, SOLUTION INTRAVENOUS at 11:07

## 2018-09-27 RX ADMIN — CEFAZOLIN SODIUM 2000 MG: 2 SOLUTION INTRAVENOUS at 11:01

## 2018-09-27 RX ADMIN — SODIUM CHLORIDE, SODIUM LACTATE, POTASSIUM CHLORIDE, AND CALCIUM CHLORIDE: .6; .31; .03; .02 INJECTION, SOLUTION INTRAVENOUS at 10:32

## 2018-09-27 RX ADMIN — FENTANYL CITRATE 25 MCG: 50 INJECTION, SOLUTION INTRAMUSCULAR; INTRAVENOUS at 11:21

## 2018-09-27 RX ADMIN — FENTANYL CITRATE 25 MCG: 50 INJECTION, SOLUTION INTRAMUSCULAR; INTRAVENOUS at 11:15

## 2018-09-27 RX ADMIN — MIDAZOLAM 2 MG: 1 INJECTION INTRAMUSCULAR; INTRAVENOUS at 11:01

## 2018-09-27 RX ADMIN — ONDANSETRON 4 MG: 2 INJECTION INTRAMUSCULAR; INTRAVENOUS at 11:17

## 2018-09-27 RX ADMIN — FENTANYL CITRATE 25 MCG: 50 INJECTION, SOLUTION INTRAMUSCULAR; INTRAVENOUS at 11:11

## 2018-09-27 RX ADMIN — FENTANYL CITRATE 25 MCG: 50 INJECTION, SOLUTION INTRAMUSCULAR; INTRAVENOUS at 11:33

## 2018-09-27 RX ADMIN — PROPOFOL 200 MG: 10 INJECTION, EMULSION INTRAVENOUS at 11:07

## 2018-09-27 NOTE — DISCHARGE INSTRUCTIONS
Rousseau Catheter Placement and Care   WHAT YOU NEED TO KNOW:   A Rousseau catheter is a sterile tube that is inserted into your bladder to drain urine  It is also called an indwelling urinary catheter  The tip of the catheter has a small balloon filled with solution that holds the catheter inside your bladder  DISCHARGE INSTRUCTIONS:   Seek care immediately if:   · Your catheter comes out  · You suddenly have material that looks like sand in the tubing or drainage bag  · No urine is draining into the bag and you have checked the system  · You have pain in your hip, back, pelvis, or lower abdomen  · You are confused or cannot think clearly  Contact your healthcare provider if:   · You have a fever  · You have bladder spasms for more than 1 day after the catheter is placed  · You see blood in the tubing or drainage bag  · You have a rash or itching where the catheter tube is secured to your skin  · Urine leaks from or around the catheter, tubing, or drainage bag  · The closed drainage system has accidently come open or apart  · You see a layer of crystals inside the tubing  · You have questions or concerns about your condition or care  Care for your Rousseau catheter:   · Clean your genital area 2 times every day  Clean your catheter and the area around where it was inserted  Use soap and water  Clean your anal opening and catheter area after every bowel movement  · Secure the catheter tube  so you do not pull or move the catheter  This helps prevent pain and bladder spasms  Healthcare providers will show you how to use medical tape or a strap to secure the catheter tube to your body  · Keep a closed drainage system  Your Rousseau catheter should always be attached to the drainage bag to form a closed system  Do not disconnect any part of the closed system unless you need to change the bag  Care for your drainage bag:   · Ask if a leg bag is right for you    A leg bag can be worn under your clothes  Ask your healthcare provider for more information about a leg bag  · Keep the drainage bag below the level of your waist   This helps stop urine from moving back up the tubing and into your bladder  Do not loop or kink the tubing  This can cause urine to back up and collect in your bladder  Do not let the drainage bag touch or lie on the floor  · Empty the drainage bag when needed  The weight of a full drainage bag can be painful  Empty the drainage bag every 3 to 6 hours or when it is ? full  · Clean and change the drainage bag as directed  Ask your healthcare provider how often you should change the drainage bag and what cleaning solution to use  Wear disposable gloves when you change the bag  Do not allow the end of the catheter or tubing to touch anything  Clean the ends with an alcohol pad before you reconnect them  What to do if problems develop:   · No urine is draining into the bag:      ¨ Check for kinks in the tubing and straighten them out  ¨ Check the tape or strap used to secure the catheter tube to your skin  Make sure it is not blocking the tube  ¨ Make sure you are not sitting or lying on the tubing  ¨ Make sure the urine bag is hanging below the level of your waist     · Urine leaks from or around the catheter, tubing, or drainage bag:  Check if the closed drainage system has accidently come open or apart  Clean the catheter and tubing ends with a new alcohol pad and reconnect them  Prevent an infection:   · Wash your hands often  Wash before and after you touch your catheter, tubing, or drainage bag  Use soap and water  Wear clean disposable gloves when you care for your catheter or disconnect the drainage bag  Wash your hands before you prepare or eat food  · Drink liquids as directed  Ask your healthcare provider how much liquid to drink each day and which liquids are best for you   Liquids will help flush your kidneys and bladder to help prevent infection  Follow up with your healthcare provider as directed:  Write down your questions so you remember to ask them during your visits  © 2017 [address] [employer] Information is for End User's use only and may not be sold, redistributed or otherwise used for commercial purposes  All illustrations and images included in CareNotes® are the copyrighted property of A D [employer] M [employer]  or [employer]  The above information is an  only  It is not intended as medical advice for individual conditions or treatments  Talk to your doctor, nurse or pharmacist before following any medical regimen to see if it is safe and effective for you  Urinary Leg Bag   WHAT YOU NEED TO KNOW:   What is a urinary leg bag? A urinary leg bag holds urine that drains from your catheter  It fits under your clothes and allows you to do your normal daily activities  How do I use a urinary leg bag? · Wash your hands  before and after you touch your catheter, tubing, or drainage bag  Use soap and water  This reduces the risk of infection  · Strap your leg bag to your thigh or calf  Make sure the straps are comfortable  The straps can cause problems with blood flow in your leg if they are too tight  · Clean the tip of the drainage tube with alcohol  before attaching it to your catheter  This helps prevent bacteria from getting into your catheter  · The connecting tube should not pull on your catheter  Skin breakdown can occur if there is constant pulling on the catheter  · Check the tube often to make sure it is not kinked or twisted  Blockage in the tube can cause urine to back up into your bladder  Your urine must flow straight through the tube into your leg bag  · Always keep the leg bag below your bladder  This prevents urine from the bag going back into your bladder, which may cause an infection  · Empty your leg bag when it is ½ full, or every 3 hours  A full bag may break or disconnect from the catheter  · Change to your bedside bag before you go to bed  Your bedside bag can hold more urine  Do not use your leg bag at night because it could become too full or break  · Clean your leg bag after every use  Fill the bag with 2 parts vinegar and 3 parts water  Let it soak for 20 minutes, then rinse and let dry  Follow your healthcare provider's instruction on replacing your leg bag with a new one  CARE AGREEMENT:   You have the right to help plan your care  Learn about your health condition and how it may be treated  Discuss treatment options with your caregivers to decide what care you want to receive  You always have the right to refuse treatment  The above information is an  only  It is not intended as medical advice for individual conditions or treatments  Talk to your doctor, nurse or pharmacist before following any medical regimen to see if it is safe and effective for you  © 2017 [address] Information is for End User's use only and may not be sold, redistributed or otherwise used for commercial purposes  All illustrations and images included in CareNotes® are the copyrighted property of A [employer] M [employer]  or [employer]

## 2018-09-27 NOTE — ANESTHESIA PREPROCEDURE EVALUATION
Review of Systems/Medical History  Patient summary reviewed  Chart reviewed  History of anesthetic complications PONV    Cardiovascular  Hyperlipidemia, Hypertension ,    Pulmonary  Negative pulmonary ROS Smoker chewing tobacco use  ,        GI/Hepatic  Negative GI/hepatic ROS               Endo/Other  Diabetes well controlled type 2 Oral agent,      GYN       Hematology  Negative hematology ROS      Musculoskeletal       Neurology   Psychology           Physical Exam    Airway    Mallampati score: III  TM Distance: >3 FB  Neck ROM: full     Dental       Cardiovascular  Rhythm: regular, Rate: normal,     Pulmonary  Breath sounds clear to auscultation,     Other Findings        Anesthesia Plan  ASA Score- 2     Anesthesia Type- general with ASA Monitors  Additional Monitors:   Airway Plan: LMA  Plan Factors- Patient instructed to abstain from smoking on day of procedure  Patient did not smoke on day of surgery  Induction- intravenous  Postoperative Plan- Plan for postoperative opioid use  Informed Consent- Anesthetic plan and risks discussed with patient  I personally reviewed this patient with the CRNA  Discussed and agreed on the Anesthesia Plan with the CRNA  Cisco Campos

## 2018-09-27 NOTE — OP NOTE
OPERATIVE REPORT  PATIENT NAME: Lakshmi Reyes    :  1975  MRN: [id number]  Pt Location: [facility]    SURGERY DATE: 2018    Surgeon(s) and Role:     * Fouzia John MD - Primary    Preop Diagnosis:  Urethral stricture, unspecified stricture type [N35 9]    Post-Op Diagnosis Codes:     * Urethral stricture, unspecified stricture type [N35 9]    Procedure(s) (LRB):  DIRECT VISUAL INTERAL URETHROTOMY (DVIU) (N/A)  Cystourethroscopy  Specimen(s):  ID Type Source Tests Collected by Time Destination   A :  Urine Urine, Cystoscopic URINE CULTURE Fozuia John MD 2018 1056        Estimated Blood Loss:   Minimal    Drains:  Urethral Catheter Double-lumen 20 Fr  (Active)   Site Assessment Clean;Skin intact; Patent 2018 11:46 AM   Collection Container Standard drainage bag 2018 11:46 AM   Securement Method Securing device (Describe) 2018 11:46 AM   Number of days: 0       Anesthesia Type:   General    Operative Indications:  Urethral stricture, unspecified stricture type [N35 9]      Operative Findings:  Dense bulbar urethral stricture over 2 cm    Complications:   None    Procedure and Technique: This patient has a known history of urethral stricture  He had been doing self dilation for several months but stopped recently  He noted some difficulty and probably UTI which was treated when seen in the office recently  Now scheduled for definitive evaluation of the stricture  He is brought to the operating room identified transferred to the OR table  General anesthesia was administered  Then placed in the lithotomy position  He was prepped with Betadine and draped  A confirmatory time-out was then performed  Completing this he then had the direct vision urethroscopy  He was found to have a bulbar stricture which was in excess of 2 cm  The stricture was incised  The scope was then advanced into the bladder    The prostate showed no significant occlusion with minimal lateral lobe enlargement  The bladder was entered  A culture specimens obtained  There was no evidence of tumor foreign better stone in the bladder  He had bilateral small Hutch diverticuli  Efflux was clear from both ureteral orifices  He tolerated this procedure well  A wire was placed into the bladder and a [place] tip 20 fr  catheter was placed into the bladder over the wire  He was awakened without incident  He tolerated procedure well transferred to PACU in good condition     I was present for the entire procedure    Patient Disposition:  PACU     SIGNATURE: Ronan Romero MD  DATE: September 27, 2018  TIME: 11:50 AM

## 2018-09-27 NOTE — H&P (VIEW-ONLY)
HISTORY AND PHYSICAL       Patient Identifiers: Selene Padilla (MRN: [id number])    Urology, Shola Mendez PA-C  Date of Service: 9/17/2018    History of Present Illness:     Selene Padilla is a 37 y o  male with history of urethral stricture  He has a history of diabetes and hypertension  He had a DVIU in June of 2017 and has been doing intermittent catheterization  He was unable to follow-up due to insurance  He was complaining of scratching feeling we inserted the catheter  I treated him with ciprofloxacin  His urine is now clear and he is much more comfortable although he does have occasional difficulty with the catheter  Past Medical, Past Surgical History:     Past Medical History:   Diagnosis Date    Diabetes mellitus ([facility] [address] )     Hyperlipidemia     Hypertension    :    Past Surgical History:   Procedure Laterality Date    APPENDECTOMY      CHOLECYSTECTOMY      CA CYSTOURETHROSCOPY,URETER CATHETER Bilateral 6/1/2017    Procedure: CYSTOSCOPY RETROGRADE PYELOGRAM WITH POSSIBLE URETHROTOMY W/ JOSE CATH INSERT;  Surgeon: Yessenia Landon MD;  Location: [facility];  Service: Urology   :    Medications, Allergies:     Current Outpatient Prescriptions:     lisinopril (ZESTRIL) 10 mg tablet, Take 10 mg by mouth daily, Disp: , Rfl:     metFORMIN (GLUCOPHAGE) 1000 MG tablet, Take 1,000 mg by mouth 2 (two) times a day with meals, Disp: , Rfl:     neomycin-bacitracin-polymyxin (NEOSPORIN) 5-400-5,000 ointment, Apply 1 application topically 2 (two) times a day (Patient not taking: Reported on 9/6/2018 ), Disp: 28 3 g, Rfl: 0    simvastatin (ZOCOR) 20 mg tablet, Take 20 mg by mouth daily at bedtime, Disp: , Rfl:     Allergies:   Allergies   Allergen Reactions    Blue Dyes (Parenteral) Anaphylaxis    Nichole Blue Anaphylaxis   :    Social and Family History:   Social History:   Social History   Substance Use Topics    Smoking status: Never Smoker    Smokeless tobacco: Current User     Types: Chew Comment: chewing tobacco    Alcohol use No        History   Smoking Status    Never Smoker   Smokeless Tobacco    Current User    Types: Chew     Comment: chewing tobacco       Family History:  No family history on file :     Review of Systems:     General: Fever, chills, or night sweats: negative  Cardiac: Negative for chest pain  Pulmonary: Negative for shortness of breath  Gastrointestinal: Abdominal pain negative  Nausea, vomiting, or diarrhea negative,  Genitourinary: See HPI above  Patient does not have hematuria  All other systems queried were negative  Physical Exam:   General: Patient is pleasant and in NAD  Awake and alert  There were no vitals taken for this visit  Cardiac: regular rate  Peripheral edema: negative  Pulmonary: Non-labored breathing lungs clear bilaterally  Abdomen: Soft, non-tender, non-distended  No surgical scars  No masses, tenderness, hernias noted  Genitourinary: Negative CVA tenderness, negative suprapubic tenderness  Labs:     Lab Results   Component Value Date    HGB 15 1 06/09/2017    HCT 43 0 06/09/2017    WBC 16 15 (H) 06/09/2017     06/09/2017   ]    Lab Results   Component Value Date     (L) 06/09/2017    K 4 4 06/09/2017     06/09/2017    CO2 27 06/09/2017    BUN 10 06/09/2017    CREATININE 0 88 06/09/2017    CALCIUM 8 7 06/09/2017   ]    Imaging:   I personally reviewed the images and report of the following studies, and reviewed them with the patient:  none    ASSESSMENT:    1  Urethral stricture   2  Diabetes   3   Hypertension    PLAN:    direct visual internal urethrotomy  and possible catheter placement    Zak Lobo PA-C

## 2018-09-27 NOTE — ANESTHESIA POSTPROCEDURE EVALUATION
Post-Op Assessment Note      CV Status:  Stable    Mental Status:  Alert and awake    Hydration Status:  Euvolemic    PONV Controlled:  Controlled    Airway Patency:  Patent    Post Op Vitals Reviewed: Yes          Staff: CRNA           BP (P) 123/77 (09/27/18 1146)    Temp (P) 98 2 °F (36 8 °C) (09/27/18 1146)    Pulse (P) 85 (09/27/18 1146)   Resp (P) 16 (09/27/18 1146)    SpO2 (P) 99 % (09/27/18 1146)

## 2018-09-28 ENCOUNTER — TELEPHONE (OUTPATIENT)
Dept: UROLOGY | Facility: CLINIC | Age: 43
End: 2018-09-28

## 2018-09-28 NOTE — TELEPHONE ENCOUNTER
I called pt to change his appt that was scheduled on 10/02/18  Pt  Needs first thing in morning appt for d/c cath and cic teaching with our nurses  There was no answer when I called pt so I did l/m with all of this information and asked pt to call our office back to r/s this appt

## 2018-09-29 LAB
BACTERIA UR CULT: ABNORMAL
BACTERIA UR CULT: ABNORMAL

## 2018-10-02 ENCOUNTER — TELEPHONE (OUTPATIENT)
Dept: UROLOGY | Facility: AMBULATORY SURGERY CENTER | Age: 43
End: 2018-10-02

## 2018-10-02 ENCOUNTER — OFFICE VISIT (OUTPATIENT)
Dept: UROLOGY | Facility: CLINIC | Age: 43
End: 2018-10-02

## 2018-10-02 VITALS
SYSTOLIC BLOOD PRESSURE: 132 MMHG | BODY MASS INDEX: 35.84 KG/M2 | HEART RATE: 74 BPM | HEIGHT: 66 IN | DIASTOLIC BLOOD PRESSURE: 68 MMHG | WEIGHT: 223 LBS

## 2018-10-02 DIAGNOSIS — N30.00 ACUTE CYSTITIS WITHOUT HEMATURIA: Primary | ICD-10-CM

## 2018-10-02 PROCEDURE — 99024 POSTOP FOLLOW-UP VISIT: CPT | Performed by: UROLOGY

## 2018-10-02 RX ORDER — AMOXICILLIN AND CLAVULANATE POTASSIUM 875; 125 MG/1; MG/1
1 TABLET, FILM COATED ORAL EVERY 12 HOURS SCHEDULED
Qty: 6 TABLET | Refills: 0 | Status: SHIPPED | OUTPATIENT
Start: 2018-10-02 | End: 2018-10-05

## 2018-10-02 NOTE — PROGRESS NOTES
Yuliya [facility]  [id number]  1975    Vitals:    10/02/18 0839   BP: 132/68   Pulse: 74       Patient presents today for catheter removal  Patient is status post Cysto/Urethrotomy on 09/27/18  The balloon was deflated and catheter was removed, with out difficulty  Patient tolerated procedure well  Patient was instructed to drink plenty of fluids and call the office or go to the ER if having difficulty urinating  The patient verbalized an understanding of all instructions

## 2018-10-02 NOTE — PROGRESS NOTES
44-year-old male with dense urethral stricture treated by urethrotomy and Rousseau catheter  Catheter was removed today  Culture at the time showed a Streptococcus  He was placed on Augmentin at this time for 3 days  I also strongly recommended that he has consultation with reconstruction specialist Dr Gerber Davila for review and recommendations  He is instructed to begin self catheterization within 24 hours  Is likely that prior to reconstruction he will be advised to hold catheterization to further define the stricture    This will be up to Dr Es Maria      patient will be seen for follow-up in 6 months

## 2021-03-10 DIAGNOSIS — Z23 ENCOUNTER FOR IMMUNIZATION: ICD-10-CM

## 2023-11-20 ENCOUNTER — APPOINTMENT (EMERGENCY)
Dept: RADIOLOGY | Facility: HOSPITAL | Age: 48
End: 2023-11-20
Payer: COMMERCIAL

## 2023-11-20 ENCOUNTER — HOSPITAL ENCOUNTER (EMERGENCY)
Facility: HOSPITAL | Age: 48
Discharge: HOME | End: 2023-11-20
Attending: EMERGENCY MEDICINE | Admitting: EMERGENCY MEDICINE
Payer: COMMERCIAL

## 2023-11-20 VITALS
HEART RATE: 85 BPM | RESPIRATION RATE: 18 BRPM | SYSTOLIC BLOOD PRESSURE: 108 MMHG | DIASTOLIC BLOOD PRESSURE: 71 MMHG | TEMPERATURE: 97.8 F | OXYGEN SATURATION: 96 %

## 2023-11-20 DIAGNOSIS — K57.32 DIVERTICULITIS, COLON: Primary | ICD-10-CM

## 2023-11-20 LAB
ALBUMIN SERPL BCP-MCNC: 4.1 G/DL (ref 3.5–5)
ALP SERPL-CCNC: 61 U/L (ref 34–104)
ALT SERPL W P-5'-P-CCNC: 14 U/L (ref 7–52)
ANION GAP SERPL CALCULATED.3IONS-SCNC: 7 MMOL/L
AST SERPL W P-5'-P-CCNC: 12 U/L (ref 13–39)
BACTERIA UR QL AUTO: NORMAL /HPF
BASOPHILS # BLD AUTO: 0.03 THOUSANDS/ÂΜL (ref 0–0.1)
BASOPHILS NFR BLD AUTO: 0 % (ref 0–1)
BILIRUB SERPL-MCNC: 0.34 MG/DL (ref 0.2–1)
BILIRUB UR QL STRIP: NEGATIVE
BUN SERPL-MCNC: 16 MG/DL (ref 5–25)
CALCIUM SERPL-MCNC: 9.3 MG/DL (ref 8.4–10.2)
CHLORIDE SERPL-SCNC: 100 MMOL/L (ref 96–108)
CLARITY UR: CLEAR
CO2 SERPL-SCNC: 30 MMOL/L (ref 21–32)
COLOR UR: YELLOW
CREAT SERPL-MCNC: 0.78 MG/DL (ref 0.6–1.3)
EOSINOPHIL # BLD AUTO: 0.18 THOUSAND/ÂΜL (ref 0–0.61)
EOSINOPHIL NFR BLD AUTO: 1 % (ref 0–6)
ERYTHROCYTE [DISTWIDTH] IN BLOOD BY AUTOMATED COUNT: 13 % (ref 11.6–15.1)
GFR SERPL CREATININE-BSD FRML MDRD: 106 ML/MIN/1.73SQ M
GLUCOSE SERPL-MCNC: 240 MG/DL (ref 65–140)
GLUCOSE UR STRIP-MCNC: ABNORMAL MG/DL
HCT VFR BLD AUTO: 46.4 % (ref 36.5–49.3)
HGB BLD-MCNC: 15.3 G/DL (ref 12–17)
HGB UR QL STRIP.AUTO: ABNORMAL
IMM GRANULOCYTES # BLD AUTO: 0.07 THOUSAND/UL (ref 0–0.2)
IMM GRANULOCYTES NFR BLD AUTO: 1 % (ref 0–2)
KETONES UR STRIP-MCNC: NEGATIVE MG/DL
LEUKOCYTE ESTERASE UR QL STRIP: NEGATIVE
LIPASE SERPL-CCNC: 59 U/L (ref 11–82)
LYMPHOCYTES # BLD AUTO: 1.02 THOUSANDS/ÂΜL (ref 0.6–4.47)
LYMPHOCYTES NFR BLD AUTO: 8 % (ref 14–44)
MCH RBC QN AUTO: 28.3 PG (ref 26.8–34.3)
MCHC RBC AUTO-ENTMCNC: 33 G/DL (ref 31.4–37.4)
MCV RBC AUTO: 86 FL (ref 82–98)
MONOCYTES # BLD AUTO: 1.14 THOUSAND/ÂΜL (ref 0.17–1.22)
MONOCYTES NFR BLD AUTO: 9 % (ref 4–12)
NEUTROPHILS # BLD AUTO: 10.37 THOUSANDS/ÂΜL (ref 1.85–7.62)
NEUTS SEG NFR BLD AUTO: 81 % (ref 43–75)
NITRITE UR QL STRIP: NEGATIVE
NON-SQ EPI CELLS URNS QL MICRO: NORMAL /HPF
NRBC BLD AUTO-RTO: 0 /100 WBCS
PH UR STRIP.AUTO: 5.5 [PH] (ref 4.5–8)
PLATELET # BLD AUTO: 165 THOUSANDS/UL (ref 149–390)
PMV BLD AUTO: 11 FL (ref 8.9–12.7)
POTASSIUM SERPL-SCNC: 4.2 MMOL/L (ref 3.5–5.3)
PROT SERPL-MCNC: 6.6 G/DL (ref 6.4–8.4)
PROT UR STRIP-MCNC: NEGATIVE MG/DL
RBC # BLD AUTO: 5.4 MILLION/UL (ref 3.88–5.62)
RBC #/AREA URNS AUTO: NORMAL /HPF
SODIUM SERPL-SCNC: 137 MMOL/L (ref 135–147)
SP GR UR STRIP.AUTO: 1.02 (ref 1–1.03)
UROBILINOGEN UR QL STRIP.AUTO: 0.2 E.U./DL
WBC # BLD AUTO: 12.81 THOUSAND/UL (ref 4.31–10.16)
WBC #/AREA URNS AUTO: NORMAL /HPF

## 2023-11-20 PROCEDURE — 96375 TX/PRO/DX INJ NEW DRUG ADDON: CPT

## 2023-11-20 PROCEDURE — G1004 CDSM NDSC: HCPCS

## 2023-11-20 PROCEDURE — 83690 ASSAY OF LIPASE: CPT

## 2023-11-20 PROCEDURE — 85025 COMPLETE CBC W/AUTO DIFF WBC: CPT

## 2023-11-20 PROCEDURE — 36415 COLL VENOUS BLD VENIPUNCTURE: CPT

## 2023-11-20 PROCEDURE — 80053 COMPREHEN METABOLIC PANEL: CPT

## 2023-11-20 PROCEDURE — 96361 HYDRATE IV INFUSION ADD-ON: CPT

## 2023-11-20 PROCEDURE — 99284 EMERGENCY DEPT VISIT MOD MDM: CPT

## 2023-11-20 PROCEDURE — 99285 EMERGENCY DEPT VISIT HI MDM: CPT | Performed by: EMERGENCY MEDICINE

## 2023-11-20 PROCEDURE — 74177 CT ABD & PELVIS W/CONTRAST: CPT

## 2023-11-20 PROCEDURE — 81001 URINALYSIS AUTO W/SCOPE: CPT

## 2023-11-20 PROCEDURE — 96374 THER/PROPH/DIAG INJ IV PUSH: CPT

## 2023-11-20 RX ORDER — KETOROLAC TROMETHAMINE 30 MG/ML
15 INJECTION, SOLUTION INTRAMUSCULAR; INTRAVENOUS ONCE
Status: COMPLETED | OUTPATIENT
Start: 2023-11-20 | End: 2023-11-20

## 2023-11-20 RX ORDER — AMOXICILLIN AND CLAVULANATE POTASSIUM 875; 125 MG/1; MG/1
1 TABLET, FILM COATED ORAL EVERY 12 HOURS
Qty: 14 TABLET | Refills: 0 | Status: SHIPPED | OUTPATIENT
Start: 2023-11-20 | End: 2023-11-27

## 2023-11-20 RX ORDER — ONDANSETRON 2 MG/ML
4 INJECTION INTRAMUSCULAR; INTRAVENOUS ONCE
Status: COMPLETED | OUTPATIENT
Start: 2023-11-20 | End: 2023-11-20

## 2023-11-20 RX ORDER — HYDROMORPHONE HCL/PF 1 MG/ML
0.5 SYRINGE (ML) INJECTION ONCE
Status: COMPLETED | OUTPATIENT
Start: 2023-11-20 | End: 2023-11-20

## 2023-11-20 RX ORDER — AMOXICILLIN AND CLAVULANATE POTASSIUM 875; 125 MG/1; MG/1
1 TABLET, FILM COATED ORAL ONCE
Status: COMPLETED | OUTPATIENT
Start: 2023-11-20 | End: 2023-11-20

## 2023-11-20 RX ADMIN — AMOXICILLIN AND CLAVULANATE POTASSIUM 1 TABLET: 875; 125 TABLET, FILM COATED ORAL at 23:43

## 2023-11-20 RX ADMIN — ONDANSETRON 4 MG: 2 INJECTION INTRAMUSCULAR; INTRAVENOUS at 21:21

## 2023-11-20 RX ADMIN — SODIUM CHLORIDE 1000 ML: 0.9 INJECTION, SOLUTION INTRAVENOUS at 21:15

## 2023-11-20 RX ADMIN — HYDROMORPHONE HYDROCHLORIDE 0.5 MG: 1 INJECTION, SOLUTION INTRAMUSCULAR; INTRAVENOUS; SUBCUTANEOUS at 21:21

## 2023-11-20 RX ADMIN — IOHEXOL 100 ML: 350 INJECTION, SOLUTION INTRAVENOUS at 22:12

## 2023-11-20 RX ADMIN — KETOROLAC TROMETHAMINE 15 MG: 30 INJECTION, SOLUTION INTRAMUSCULAR; INTRAVENOUS at 21:21

## 2023-11-21 NOTE — ED PROVIDER NOTES
History  Chief Complaint   Patient presents with    Abdominal Pain     Pt c/o lower abdominal/pelvic pain starting today. Pt states pain radiates from LLQ into back. Denies chest pain, denies SOB. Denies urinary symptoms, but states pain is similar to previous UTI. HPI    Patient is a 49-year-old male with past medical history of diabetes, hyperlipidemia, hypertension, presented to the ED for evaluation of suprapubic and left lower quadrant pain and pressure since early this morning. Patient reports history of urethral stricture at baseline, but reports increased pressure while urinating today. No hematuria. Patient endorses fever earlier today Tmax 101, for which she took Tylenol with resolution. Patient denies any cough or congestion, chills, chest pain, shortness of breath, nausea or vomiting. Patient states that the pain in his left lower belly occasionally radiates up to the left flank. Denies history of right kidney stones. Patient denies any low back pain. Prior to Admission Medications   Prescriptions Last Dose Informant Patient Reported?  Taking?   lisinopril (ZESTRIL) 10 mg tablet  Self Yes No   Sig: Take 10 mg by mouth daily   metFORMIN (GLUCOPHAGE) 1000 MG tablet  Self Yes No   Sig: Take 1,000 mg by mouth 2 (two) times a day with meals   simvastatin (ZOCOR) 20 mg tablet  Self Yes No   Sig: Take 20 mg by mouth daily at bedtime   traMADol (ULTRAM) 50 mg tablet   No No   Sig: Take 1 tablet (50 mg total) by mouth every 6 (six) hours as needed for moderate pain for up to 10 doses      Facility-Administered Medications: None       Past Medical History:   Diagnosis Date    Diabetes mellitus ([address])     Hyperlipidemia     Hypertension     PONV (postoperative nausea and vomiting)        Past Surgical History:   Procedure Laterality Date    APPENDECTOMY      CHOLECYSTECTOMY      NH CYSTO BLADDER W/URETERAL CATHETERIZATION Bilateral 6/1/2017    Procedure: CYSTOSCOPY RETROGRADE PYELOGRAM WITH POSSIBLE URETHROTOMY W/ JOSE CATH INSERT;  Surgeon: Alejandro Bone MD;  Location: [facility];  Service: Urology    WA CYSTOURETHROSCOPY W/INTERNAL URETHROTOMY N/A 9/27/2018    Procedure: DIRECT VISUAL INTERAL URETHROTOMY (DVIU); Surgeon: Alejandro Bone MD;  Location: [facility];  Service: Urology       Family History   Problem Relation Age of Onset    No Known Problems Mother     No Known Problems Father      I have reviewed and agree with the history as documented. E-Cigarette/Vaping     E-Cigarette/Vaping Substances     Social History     Tobacco Use    Smoking status: Never    Smokeless tobacco: Current     Types: Chew    Tobacco comments:     chewing tobacco all day long   Substance Use Topics    Alcohol use: No    Drug use: No        Review of Systems   All other systems reviewed and are negative. Physical Exam  ED Triage Vitals [11/20/23 2024]   Temperature Pulse Respirations Blood Pressure SpO2   97.8 °F (36.6 °C) 76 18 108/71 97 %      Temp Source Heart Rate Source Patient Position - Orthostatic VS BP Location FiO2 (%)   Oral Monitor Sitting Left arm --      Pain Score       8             Orthostatic Vital Signs  Vitals:    11/20/23 2024 11/20/23 2230   BP: 108/71    Pulse: 76 85   Patient Position - Orthostatic VS: Sitting        Physical Exam  Vitals and nursing note reviewed. Constitutional:       General: He is not in acute distress. Appearance: He is well-developed and normal weight. He is not ill-appearing or toxic-appearing. HENT:      Head: Normocephalic and atraumatic. Mouth/Throat:      Mouth: Mucous membranes are moist.      Pharynx: Oropharynx is clear. Eyes:      Extraocular Movements: Extraocular movements intact. Conjunctiva/sclera: Conjunctivae normal.   Cardiovascular:      Rate and Rhythm: Normal rate and regular rhythm. Heart sounds: Normal heart sounds. No murmur heard. Pulmonary:      Effort: Pulmonary effort is normal. No respiratory distress.       Breath sounds: Normal breath sounds. No wheezing, rhonchi or rales. Abdominal:      General: Abdomen is flat. Bowel sounds are normal.      Palpations: Abdomen is soft. Tenderness: There is abdominal tenderness in the suprapubic area and left lower quadrant. There is no guarding or rebound. Hernia: No hernia is present. Musculoskeletal:         General: No swelling. Cervical back: Neck supple. Skin:     General: Skin is warm and dry. Capillary Refill: Capillary refill takes less than 2 seconds. Coloration: Skin is not pale. Neurological:      General: No focal deficit present. Mental Status: He is alert and oriented to person, place, and time.    Psychiatric:         Mood and Affect: Mood normal.         ED Medications  Medications   sodium chloride 0.9 % bolus 1,000 mL (0 mL Intravenous Stopped 11/20/23 2339)   ondansetron (ZOFRAN) injection 4 mg (4 mg Intravenous Given 11/20/23 2121)   ketorolac (TORADOL) injection 15 mg (15 mg Intravenous Given 11/20/23 2121)   HYDROmorphone (DILAUDID) injection 0.5 mg (0.5 mg Intravenous Given 11/20/23 2121)   iohexol (OMNIPAQUE) 350 MG/ML injection (SINGLE-DOSE) 100 mL (100 mL Intravenous Given 11/20/23 2212)   amoxicillin-clavulanate (AUGMENTIN) 875-125 mg per tablet 1 tablet (1 tablet Oral Given 11/20/23 2343)       Diagnostic Studies  Results Reviewed       Procedure Component Value Units Date/Time    Comprehensive metabolic panel [[id number]]  (Abnormal) Collected: 11/20/23 2113    Lab Status: Final result Specimen: Blood from Arm, Right Updated: 11/20/23 2155     Sodium 137 mmol/L      Potassium 4.2 mmol/L      Chloride 100 mmol/L      CO2 30 mmol/L      ANION GAP 7 mmol/L      BUN 16 mg/dL      Creatinine 0.78 mg/dL      Glucose 240 mg/dL      Calcium 9.3 mg/dL      AST 12 U/L      ALT 14 U/L      Alkaline Phosphatase 61 U/L      Total Protein 6.6 g/dL      Albumin 4.1 g/dL      Total Bilirubin 0.34 mg/dL      eGFR 106 ml/min/1.73sq m Narrative:      National Kid[employer] Disease Foundation guidelines for Chronic Kidney Disease (CKD):     Stage 1 with normal or high GFR (GFR > 90 mL/min/1.73 square meters)    Stage 2 Mild CKD (GFR = 60-89 mL/min/1.73 square meters)    Stage 3A Moderate CKD (GFR = 45-59 mL/min/1.73 square meters)    Stage 3B Moderate CKD (GFR = 30-44 mL/min/1.73 square meters)    Stage 4 Severe CKD (GFR = 15-29 mL/min/1.73 square meters)    Stage 5 End Stage CKD (GFR <15 mL/min/1.73 square meters)  Note: GFR calculation is accurate only with a steady state creatinine    Lipase [[id number]]  (Normal) Collected: 11/20/23 2113    Lab Status: Final result Specimen: Blood from Arm, Right Updated: 11/20/23 2155     Lipase 59 u/L     Urine Microscopic [[id number]]  (Normal) Collected: 11/20/23 2130    Lab Status: Final result Specimen: Urine, Clean Catch Updated: 11/20/23 2143     RBC, UA None Seen /hpf      WBC, UA 1-2 /hpf      Epithelial Cells None Seen /hpf      Bacteria, UA Occasional /hpf     Urine Macroscopic, POC [[id number]]  (Abnormal) Collected: 11/20/23 2130    Lab Status: Final result Specimen: Urine Updated: 11/20/23 2131     Color, UA Yellow     Clarity, UA Clear     pH, UA 5.5     Leukocytes, UA Negative     Nitrite, UA Negative     Protein, UA Negative mg/dl      Glucose,  (1/2%) mg/dl      Ketones, UA Negative mg/dl      Urobilinogen, UA 0.2 E.U./dl      Bilirubin, UA Negative     Occult Blood, UA Trace     Specific Gravity, UA 1.025    Narrative:      CLINITEK RESULT    CBC and differential [[id number]]  (Abnormal) Collected: 11/20/23 2113    Lab Status: Final result Specimen: Blood from Arm, Right Updated: 11/20/23 2130     WBC 12.81 Thousand/uL      RBC 5.40 Million/uL      Hemoglobin 15.3 g/dL      Hematocrit 46.4 %      MCV 86 fL      MCH 28.3 pg      MCHC 33.0 g/dL      RDW 13.0 %      MPV 11.0 fL      Platelets 290 Thousands/uL      nRBC 0 /100 WBCs      Neutrophils Relative 81 %      Immat GRANS % 1 %      Lymphocytes Relative 8 %      Monocytes Relative 9 %      Eosinophils Relative 1 %      Basophils Relative 0 %      Neutrophils Absolute 10.37 Thousands/µL      Immature Grans Absolute 0.07 Thousand/uL      Lymphocytes Absolute 1.02 Thousands/µL      Monocytes Absolute 1.14 Thousand/µL      Eosinophils Absolute 0.18 Thousand/µL      Basophils Absolute 0.03 Thousands/µL                    CT abdomen pelvis with contrast   Final Result by Chaparro Arbolead MD (11/20 2326)      Findings consistent with acute focal colitis/diverticulitis of the distal descending colon. Recommend follow-up colonoscopy when clinically appropriate after resolution of acute illness to exclude neoplasm in this region. No bowel obstruction,    obstructive uropathy, free air, free fluid, or loculated fluid collection/abscess in the abdomen/pelvis. Other ancillary findings detailed above. Workstation performed: [id number]               Procedures  Procedures      ED Course  ED Course as of 11/20/23 2344   Mon Nov 20, 2023 2136 Leukocytes, UA: Negative   2136 Nitrite, UA: Negative   2324 No evidence of UTI     1760 CT shows uncomplicated diverticulitis. Patient given first dose of Augmentin in the ER. Discharged with a prescription for 7 days. Went over results with patient at bedside. His pain is improved. Explained to the patient that after his diverticulitis is treated, he needs to see GI and schedule colonoscopy. Patient is agreeable with the plan. Return cautions were given. Patient was ambulatory at discharge. Patient is a 70-year-old male presented to the ED for evaluation of 1 day history of suprapubic pain and left-sided abdominal pain, occasionally radiating to the flank. Differential includes UTI, kidney stone, diverticulitis, intra-abdominal abscess, constipation. We will do abdominal labs, CT AP rule out above. Patient given fluids, Zofran, pain control. Please see reevaluations above.  Patient discharged home.                      SBIRT 22yo+      Flowsheet Row Most Recent Value   URIEL: How many times in the past year have you. .. Used an illegal drug or used a prescription medication for non-medical reasons? Never Filed at: 11/20/2023 2028                  Medical Decision Making  Amount and/or Complexity of Data Reviewed  Labs: ordered. Decision-making details documented in ED Course. Radiology: ordered. Risk  Prescription drug management. Disposition  Final diagnoses:   Diverticulitis, colon     Time reflects when diagnosis was documented in both MDM as applicable and the Disposition within this note       Time User Action Codes Description Comment    11/20/2023 11:29 PM Annetta Cordova Add [K57.32] Diverticulitis, colon           ED Disposition       ED Disposition   Discharge    Condition   Stable    Date/Time   Mon Nov 20, 2023 [address] [place] [address] [facility]/self care. Follow-up Information       Follow up With Specialties Details Why Contact Info Additional Information    Janes Garcia DO Family Medicine Schedule an appointment as soon as possible for a visit   [address] [place] [address] Gastroenterology [facility] Gastroenterology   [facility] [address] [facility] [id number] [phone] [facility] Gastroenterology [facility], [address] [facility], [address] [place] [address], [id number] [phone]            Patient's Medications   Discharge Prescriptions    AMOXICILLIN-CLAVULANATE (AUGMENTIN) 875-125 MG PER TABLET    Take 1 tablet by mouth every 12 (twelve) hours for 7 days       Start Date: 11/20/2023End Date: 11/27/2023       Order Dose: 1 tablet       Quantity: 14 tablet    Refills: 0     No discharge procedures on file. PDMP Review       None             ED Provider  Attending physically available and evaluated Jovani Salter.  I managed the patient along with the ED Attending.     Electronically Signed by           Trav Garza DO  11/20/23 4091

## 2023-11-21 NOTE — DISCHARGE INSTRUCTIONS
Please take antibiotics as prescribed for diverticulitis. Light diet. Plenty of hydration. Return for fevers while on antibiotics, increased pain, bloody stools. Please see GI after diverticulitis resolves so that you can schedule a colonoscopy.

## 2023-11-21 NOTE — ED ATTENDING ATTESTATION
11/20/2023  Gold Mayfield DO, saw and evaluated the patient. I have discussed the patient with the resident/non-physician practitioner and agree with the resident's/non-physician practitioner's findings, Plan of Care, and MDM as documented in the resident's/non-physician practitioner's note, except where noted. All available labs and Radiology studies were reviewed. I was present for key portions of any procedure(s) performed by the resident/non-physician practitioner and I was immediately available to provide assistance. At this point I agree with the current assessment done in the Emergency Department. I have conducted an independent evaluation of this patient a history and physical is as follows:    49 yo male w/hx recurrent UTI presents for eval of lower abd/pelvic pain with radiation up L flank into back. Noted a fever today Tmax 101. No vomiting, rash, diarrhea. Imp: probable UTI. Also consider diverticulitis given location of pain and fever. plan: UA, abd labs, CT abd/pelvis to eval diverticulitis, possible infected stone.       ED Course         Critical Care Time  Procedures

## 2023-12-08 ENCOUNTER — TELEPHONE (OUTPATIENT)
Dept: GASTROENTEROLOGY | Facility: CLINIC | Age: 48
End: 2023-12-08

## 2023-12-08 ENCOUNTER — OFFICE VISIT (OUTPATIENT)
Dept: GASTROENTEROLOGY | Facility: CLINIC | Age: 48
End: 2023-12-08
Payer: COMMERCIAL

## 2023-12-08 VITALS
TEMPERATURE: 97.8 F | HEIGHT: 66 IN | WEIGHT: 211.8 LBS | BODY MASS INDEX: 34.04 KG/M2 | SYSTOLIC BLOOD PRESSURE: 124 MMHG | DIASTOLIC BLOOD PRESSURE: 82 MMHG

## 2023-12-08 DIAGNOSIS — K57.92 DIVERTICULITIS: ICD-10-CM

## 2023-12-08 DIAGNOSIS — Z53.20 SCREENING FOR HEPATITIS C DECLINED: ICD-10-CM

## 2023-12-08 DIAGNOSIS — Z12.11 SCREENING FOR COLON CANCER: Primary | ICD-10-CM

## 2023-12-08 PROBLEM — E66.9 OBESITY (BMI 30-39.9): Status: ACTIVE | Noted: 2023-12-08

## 2023-12-08 PROBLEM — E78.2 MIXED HYPERLIPIDEMIA: Status: ACTIVE | Noted: 2023-12-08

## 2023-12-08 PROBLEM — E11.3213 CONTROLLED TYPE 2 DIABETES MELLITUS WITH BOTH EYES AFFECTED BY MILD NONPROLIFERATIVE RETINOPATHY AND MACULAR EDEMA, WITHOUT LONG-TERM CURRENT USE OF INSULIN (HCC): Status: ACTIVE | Noted: 2023-12-08

## 2023-12-08 PROBLEM — I10 ESSENTIAL HYPERTENSION: Status: ACTIVE | Noted: 2023-12-08

## 2023-12-08 PROCEDURE — 99203 OFFICE O/P NEW LOW 30 MIN: CPT | Performed by: PHYSICIAN ASSISTANT

## 2023-12-08 RX ORDER — EMPAGLIFLOZIN 25 MG/1
25 TABLET, FILM COATED ORAL DAILY
COMMUNITY
Start: 2023-11-22

## 2023-12-08 RX ORDER — CEPHALEXIN 500 MG/1
CAPSULE ORAL
COMMUNITY
Start: 2023-10-09

## 2023-12-08 RX ORDER — LIRAGLUTIDE 6 MG/ML
INJECTION SUBCUTANEOUS
COMMUNITY

## 2023-12-08 NOTE — PATIENT INSTRUCTIONS
Scheduled date of colonoscopy (as of today):02.01.24  Physician performing colonoscopy:DR RHOADES  Location of colonoscopy:[facility]  Bowel prep reviewed with patient:PEACE  Instructions reviewed with patient by:MARIA R  Clearances: N/A

## 2023-12-08 NOTE — PROGRESS NOTES
[facility] - Outpatient Consultation  Stew Endo 50 y.o. male MRN: [id number]  Encounter: [id number]          ASSESSMENT AND PLAN:      1. Screening for colon cancer  He is due for screening colonoscopy. He is average risk. I discussed the purpose of colonoscopy is to detect and remove polyps to prevent cancer in the future. Explained risks and benefits of colonoscopy. Risks include but are not limited to infection, bleeding, perforation, missed lesions. He expressed understanding and agrees to proceed. We will schedule at least 6-8 weeks out from his diverticulitis diagnosis. Gave diabetic and Golytely prep instructions. - Colonoscopy; Future  - polyethylene glycol (GOLYTELY) 4000 mL solution; Take as directed by the office for colonoscopy. Dispense: 4000 mL; Refill: 0    2. Diverticulitis  Recently discharged from ED with uncomplicated left sided diverticulitis. LLQ pain completely resolved with course of Augmentin. Encouraged high fiber diet and avoidance of constipation. 3. Screening for hepatitis C declined  I explained the USPSTF recommendation for 1 time hep C screening but patient politely declines. Follow-up as needed  ______________________________________________________________________    HPI:  66-year-old male with controlled type 2 diabetes mellitus (A1c 5.7), hypertension, hyperlipidemia referred by the ED for uncomplicated diverticulitis. He developed acute LLQ pain and chills on Monday, 11/20. He went to the ED and had blood work which showed mild leukocytosis. He was afebrile. CT A/P with IV contrast with findings consistent with acute focal colitis/diverticulitis of the distal descending colon. He was discharged on 7 day course of Augmentin. He felt better within 3 days. He followed clear liquid diet for 2 days then tolerated a normal Thanksgiving meal. He has been eating normal diet since without issues.  He denies abdominal pain, nausea, vomiting, diarrhea, constipation, melena, hematochezia, abnormal weight loss. He has never had colonoscopy. He denies family history of colon cancer. He uses chewing tobacco.     Surgical history includes appendectomy and cholecystectomy. Answers submitted by the patient for this visit:  Abdominal Pain Questionnaire (Submitted on 12/6/2023)  Chief Complaint: Abdominal pain  Chronicity: new  Onset: 1 to 4 weeks ago  Onset quality: sudden  Frequency: rarely  Episode duration: 1 Days  Progression since onset: resolved  Pain location: left flank  Pain - numeric: 5/10  Pain quality: sharp  Radiates to: pelvis  anorexia: Yes  arthralgias: No  belching: No  constipation: No  diarrhea: Yes  dysuria: No  fever: Yes  flatus: No  frequency: No  headaches: No  hematochezia: No  hematuria: No  melena: No  myalgias: No  nausea: No  weight loss: No  vomiting: No  Aggravated by: nothing  Relieved by: nothing  Diagnostic workup: CT scan      REVIEW OF SYSTEMS:    CONSTITUTIONAL: Denies any fever, chills, rigors, and weight loss. HEENT: No earache or tinnitus. Denies hearing loss or visual disturbances. CARDIOVASCULAR: No chest pain or palpitations. RESPIRATORY: Denies any cough, hemoptysis, shortness of breath or dyspnea on exertion. GASTROINTESTINAL: As noted in the History of Present Illness. GENITOURINARY: No problems with urination. Denies any hematuria or dysuria. NEUROLOGIC: No dizziness or vertigo, denies headaches. MUSCULOSKELETAL: Denies any muscle or joint pain. SKIN: Denies skin rashes or itching. ENDOCRINE: Denies excessive thirst. Denies intolerance to heat or cold. PSYCHOSOCIAL: Denies depression or anxiety. Denies any recent memory loss.        Historical Information   Past Medical History:   Diagnosis Date    Diabetes mellitus ([address])     Hyperlipidemia     Hypertension     PONV (postoperative nausea and vomiting)      Past Surgical History:   Procedure Laterality Date    APPENDECTOMY CHOLECYSTECTOMY      DE CYSTO BLADDER W/URETERAL CATHETERIZATION Bilateral 6/1/2017    Procedure: CYSTOSCOPY RETROGRADE PYELOGRAM WITH POSSIBLE URETHROTOMY W/ JOSE CATH INSERT;  Surgeon: Aron Parrish MD;  Location: [facility];  Service: Urology    DE CYSTOURETHROSCOPY W/INTERNAL URETHROTOMY N/A 9/27/2018    Procedure: DIRECT VISUAL INTERAL URETHROTOMY (DVIU); Surgeon: Aron Parrish MD;  Location: [facility];  Service: Urology     Social History   Social History     Substance and Sexual Activity   Alcohol Use No     Social History     Substance and Sexual Activity   Drug Use No     Social History     Tobacco Use   Smoking Status Never   Smokeless Tobacco Current    Types: Chew   Tobacco Comments    chewing tobacco all day long     Family History   Problem Relation Age of Onset    No Known Problems Mother     No Known Problems Father        Meds/Allergies       Current Outpatient Medications:     cephalexin (KEFLEX) 500 mg capsule    Jardiance 25 MG TABS    metFORMIN (GLUCOPHAGE) 1000 MG tablet    polyethylene glycol (GOLYTELY) 4000 mL solution    simvastatin (ZOCOR) 20 mg tablet    Victoza injection    lisinopril (ZESTRIL) 10 mg tablet    traMADol (ULTRAM) 50 mg tablet    Allergies   Allergen Reactions    Blue Dyes (Parenteral) - Food Allergy Anaphylaxis    Nichole Blue Anaphylaxis           Objective     Blood pressure 124/82, temperature 97.8 °F (36.6 °C), temperature source Tympanic, height 5' 6" (1.676 m), weight 96.1 kg (211 lb 12.8 oz). Body mass index is 34.19 kg/m². PHYSICAL EXAM:      General Appearance:   Alert, cooperative, no distress   HEENT:   Normocephalic, atraumatic, anicteric. Neck:  Supple, symmetrical, trachea midline   Lungs:   Clear to auscultation bilaterally; no rales, rhonchi or wheezing; respirations unlabored    Heart[de-identified]   Regular rate and rhythm; no murmur, rub, or gallop.    Abdomen:   Soft, non-tender, non-distended; normal bowel sounds; no masses, no organomegaly Genitalia:   Deferred    Rectal:   Deferred    Extremities:  No cyanosis, clubbing or edema    Pulses:  2+ and symmetric    Skin:  No jaundice, rashes, or lesions    Lymph nodes:  No palpable cervical lymphadenopathy        Lab Results:   No visits with results within 1 Day(s) from this visit.    Latest known visit with results is:   Admission on 11/20/2023, Discharged on 11/20/2023   Component Date Value    WBC 11/20/2023 12.81 (H)     RBC 11/20/2023 5.40     Hemoglobin 11/20/2023 15.3     Hematocrit 11/20/2023 46.4     MCV 11/20/2023 86     MCH 11/20/2023 28.3     MCHC 11/20/2023 33.0     RDW 11/20/2023 13.0     MPV 11/20/2023 11.0     Platelets 07/80/1120 165     nRBC 11/20/2023 0     Neutrophils Relative 11/20/2023 81 (H)     Immat GRANS % 11/20/2023 1     Lymphocytes Relative 11/20/2023 8 (L)     Monocytes Relative 11/20/2023 9     Eosinophils Relative 11/20/2023 1     Basophils Relative 11/20/2023 0     Neutrophils Absolute 11/20/2023 10.37 (H)     Immature Grans Absolute 11/20/2023 0.07     Lymphocytes Absolute 11/20/2023 1.02     Monocytes Absolute 11/20/2023 1.14     Eosinophils Absolute 11/20/2023 0.18     Basophils Absolute 11/20/2023 0.03     Sodium 11/20/2023 137     Potassium 11/20/2023 4.2     Chloride 11/20/2023 100     CO2 11/20/2023 30     ANION GAP 11/20/2023 7     BUN 11/20/2023 16     Creatinine 11/20/2023 0.78     Glucose 11/20/2023 240 (H)     Calcium 11/20/2023 9.3     AST 11/20/2023 12 (L)     ALT 11/20/2023 14     Alkaline Phosphatase 11/20/2023 61     Total Protein 11/20/2023 6.6     Albumin 11/20/2023 4.1     Total Bilirubin 11/20/2023 0.34     eGFR 11/20/2023 106     Lipase 11/20/2023 59     Color, UA 11/20/2023 Yellow     Clarity, UA 11/20/2023 Clear     pH, UA 11/20/2023 5.5     Leukocytes, UA 11/20/2023 Negative     Nitrite, UA 11/20/2023 Negative     Protein, UA 11/20/2023 Negative     Glucose, UA 11/20/2023 500 (1/2%) (A)     Ketones, UA 11/20/2023 Negative     Urobilinogen, UA 11/20/2023 0.2     Bilirubin, UA 11/20/2023 Negative     Occult Blood, UA 11/20/2023 Trace (A)     Specific Vega, UA 11/20/2023 1.025     RBC, UA 11/20/2023 None Seen     WBC, UA 11/20/2023 1-2     Epithelial Cells 11/20/2023 None Seen     Bacteria, UA 11/20/2023 Occasional          Radiology Results:   CT abdomen pelvis with contrast    Result Date: 11/20/2023  Narrative: CT ABDOMEN AND PELVIS WITH IV CONTRAST INDICATION:   LLQ abdominal pain suprapubic and LLQ pain with fever beginning today; wrapping to the L flank at times. stone vs diverticulitis. COMPARISON: 5/24/2017. TECHNIQUE:  CT examination of the abdomen and pelvis was performed. Multiplanar 2D reformatted images were created from the source data. This examination, like all CT scans performed in the [facility], was performed utilizing techniques to minimize radiation dose exposure, including the use of iterative reconstruction and automated exposure control. Radiation dose length product (DLP) for this visit:  886.92 mGy-cm IV Contrast:  100 mL of iohexol (OMNIPAQUE) Enteric Contrast:  Enteric contrast was not administered. FINDINGS: ABDOMEN LOWER CHEST:  No clinically significant abnormality identified in the visualized lower chest. LIVER/BILIARY TREE:  Unremarkable. GALLBLADDER:  Gallbladder is surgically absent. SPLEEN:  Unremarkable. Accessory splenule in the splenic hilar region incidentally seen. PANCREAS:  Unremarkable. ADRENAL GLANDS:  Unremarkable. KIDNEYS/URETERS:  Unremarkable. No hydronephrosis. STOMACH AND BOWEL: Stomach is mildly distended with air and fluid. No gross intraluminal mass. The small and large bowel loops appear normal in course and caliber without evidence for obstruction. Terminal ileum is grossly unremarkable. Appendix not definitively visualized but no pericecal inflammatory changes. Abnormal wall thickening of the distal descending colon with pericolonic mesenteric inflammatory stranding is seen. Diverticulosis in this region also noted. A few subcentimeter likely reactive pericolonic mesenteric lymph nodes are present. No adjacent free air or abscess. Overall findings suggest acute focal colitis/diverticulitis of the distal descending colon. Recommend follow-up colonoscopy when clinically appropriate after resolution of acute illness to exclude neoplasm in this region. APPENDIX:  No findings to suggest appendicitis. ABDOMINOPELVIC CAVITY:  No ascites or loculated fluid collection. No pneumoperitoneum. No lymphadenopathy. VESSELS:  Unremarkable for patient's age. PELVIS REPRODUCTIVE ORGANS:  Unremarkable for patient's age. URINARY BLADDER:  Unremarkable. ABDOMINAL WALL/INGUINAL REGIONS: Mild skin thickening in the posterior intergluteal fold partially visualized of uncertain etiology. These findings appear similar compared to the prior exam. OSSEOUS STRUCTURES:  No acute fracture or destructive osseous lesion. Multilevel degenerative changes of the visualized thoracolumbar spine     Impression: Findings consistent with acute focal colitis/diverticulitis of the distal descending colon. Recommend follow-up colonoscopy when clinically appropriate after resolution of acute illness to exclude neoplasm in this region. No bowel obstruction, obstructive uropathy, free air, free fluid, or loculated fluid collection/abscess in the abdomen/pelvis. Other ancillary findings detailed above.  Workstation performed: [id number]

## 2024-01-17 ENCOUNTER — TELEPHONE (OUTPATIENT)
Dept: GASTROENTEROLOGY | Facility: CLINIC | Age: 49
End: 2024-01-17

## 2024-01-18 ENCOUNTER — ANESTHESIA (OUTPATIENT)
Dept: ANESTHESIOLOGY | Facility: HOSPITAL | Age: 49
End: 2024-01-18

## 2024-01-18 ENCOUNTER — ANESTHESIA EVENT (OUTPATIENT)
Dept: ANESTHESIOLOGY | Facility: HOSPITAL | Age: 49
End: 2024-01-18

## 2024-02-12 ENCOUNTER — ANESTHESIA EVENT (OUTPATIENT)
Dept: GASTROENTEROLOGY | Facility: AMBULARY SURGERY CENTER | Age: 49
End: 2024-02-12

## 2024-02-12 ENCOUNTER — ANESTHESIA (OUTPATIENT)
Dept: GASTROENTEROLOGY | Facility: AMBULARY SURGERY CENTER | Age: 49
End: 2024-02-12

## 2024-02-12 ENCOUNTER — HOSPITAL ENCOUNTER (OUTPATIENT)
Dept: GASTROENTEROLOGY | Facility: AMBULARY SURGERY CENTER | Age: 49
Setting detail: OUTPATIENT SURGERY
Discharge: HOME | End: 2024-02-12
Attending: INTERNAL MEDICINE
Payer: COMMERCIAL

## 2024-02-12 VITALS
HEIGHT: 67 IN | RESPIRATION RATE: 19 BRPM | WEIGHT: 213 LBS | SYSTOLIC BLOOD PRESSURE: 151 MMHG | HEART RATE: 74 BPM | TEMPERATURE: 97.7 F | OXYGEN SATURATION: 98 % | BODY MASS INDEX: 33.43 KG/M2 | DIASTOLIC BLOOD PRESSURE: 83 MMHG

## 2024-02-12 DIAGNOSIS — Z12.11 SCREENING FOR COLON CANCER: ICD-10-CM

## 2024-02-12 LAB — GLUCOSE SERPL-MCNC: 138 MG/DL (ref 65–140)

## 2024-02-12 PROCEDURE — 88305 TISSUE EXAM BY PATHOLOGIST: CPT | Performed by: STUDENT IN AN ORGANIZED HEALTH CARE EDUCATION/TRAINING PROGRAM

## 2024-02-12 PROCEDURE — 82948 REAGENT STRIP/BLOOD GLUCOSE: CPT

## 2024-02-12 PROCEDURE — 45385 COLONOSCOPY W/LESION REMOVAL: CPT | Performed by: INTERNAL MEDICINE

## 2024-02-12 RX ORDER — PROPOFOL 10 MG/ML
INJECTION, EMULSION INTRAVENOUS AS NEEDED
Status: DISCONTINUED | OUTPATIENT
Start: 2024-02-12 | End: 2024-02-12

## 2024-02-12 RX ORDER — PROPOFOL 10 MG/ML
INJECTION, EMULSION INTRAVENOUS CONTINUOUS PRN
Status: DISCONTINUED | OUTPATIENT
Start: 2024-02-12 | End: 2024-02-12

## 2024-02-12 RX ORDER — SODIUM CHLORIDE, SODIUM LACTATE, POTASSIUM CHLORIDE, CALCIUM CHLORIDE 600; 310; 30; 20 MG/100ML; MG/100ML; MG/100ML; MG/100ML
INJECTION, SOLUTION INTRAVENOUS CONTINUOUS PRN
Status: DISCONTINUED | OUTPATIENT
Start: 2024-02-12 | End: 2024-02-12

## 2024-02-12 RX ADMIN — Medication 40 MG: at 07:35

## 2024-02-12 RX ADMIN — PROPOFOL 100 MG: 10 INJECTION, EMULSION INTRAVENOUS at 07:33

## 2024-02-12 RX ADMIN — SODIUM CHLORIDE, SODIUM LACTATE, POTASSIUM CHLORIDE, AND CALCIUM CHLORIDE: .6; .31; .03; .02 INJECTION, SOLUTION INTRAVENOUS at 07:31

## 2024-02-12 RX ADMIN — PROPOFOL 100 MCG/KG/MIN: 10 INJECTION, EMULSION INTRAVENOUS at 07:33

## 2024-02-12 NOTE — H&P
"History and Physical -  [facility]  Adelfo Robertson 48 y.o. male MRN: [id number]    HPI: Adelfo Robertson is a 48 y.o. year old male who presents for screening colonoscopy      Review of Systems    Historical Information   Past Medical History:   Diagnosis Date    Diabetes mellitus (HCC)     Diverticulitis     Fatty liver     Hyperlipidemia     Hypertension     PONV (postoperative nausea and vomiting)      Past Surgical History:   Procedure Laterality Date    APPENDECTOMY      CHOLECYSTECTOMY      [place] CYSTO BLADDER W/URETERAL CATHETERIZATION Bilateral 6/1/2017    Procedure: CYSTOSCOPY RETROGRADE PYELOGRAM WITH POSSIBLE URETHROTOMY W/ JOSE CATH INSERT;  Surgeon: Alessandro Fagan MD;  [facility];  Service: Urology    [place] CYSTOURETHROSCOPY W/INTERNAL URETHROTOMY N/A 9/27/2018    Procedure: DIRECT VISUAL INTERAL URETHROTOMY (DVIU);  Surgeon: Alessandro Fagan MD;  Location[facility];  Service: Urology     Social History   Social History     Substance and Sexual Activity   Alcohol Use No     Social History     Substance and Sexual Activity   Drug Use No     Social History     Tobacco Use   Smoking Status Never   Smokeless Tobacco Current    Types: Chew   Tobacco Comments    chewing tobacco all day long     Family History   Problem Relation Age of Onset    No Known Problems Mother     No Known Problems Father        Meds/Allergies     (Not in a hospital admission)      Allergies   Allergen Reactions    Blue Dyes (Parenteral) - Food Allergy Anaphylaxis    Nichole Blue Anaphylaxis       Objective     /78   Pulse 71   Temp 98.1 °F (36.7 °C) (Temporal)   Resp 18   Ht 5' 7\" (1.702 m)   Wt 96.6 kg (213 lb)   SpO2 100%   BMI 33.36 kg/m²       PHYSICAL EXAM    Gen: NAD  CV: RRR  CHEST: Clear  ABD: soft, NT/ND  EXT: no edema  Neuro: AAO      ASSESSMENT/PLAN:  This is a 48 y.o. year old male here for colonoscopy    PLAN:   Procedure: colonoscopy      "
show

## 2024-02-12 NOTE — ANESTHESIA PREPROCEDURE EVALUATION
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         Additional Monitors:     Airway Plan:            Plan Factors-Exercise tolerance (METS): >4 METS.    Chart reviewed. EKG reviewed.  Existing labs reviewed. Patient summary reviewed.    Patient is not a current smoker.              Induction- intravenous.    Postoperative Plan-     Informed Consent- Anesthetic plan and risks discussed with patient.  I personally reviewed this patient with the CRNA. Discussed and agreed on the Anesthesia Plan with the CRNA..

## (undated) DEVICE — GUIDEWIRE STRGHT TIP 0.035 IN  SOLO PLUS

## (undated) DEVICE — CATH FOLEY COUNCIL 20FR 5ML 2 WAY LUBRICATH

## (undated) DEVICE — GLOVE SRG BIOGEL ECLIPSE 8

## (undated) DEVICE — CATH FOLEY 20FR 5ML 2 WAY UNCOATED SILICONE

## (undated) DEVICE — BAG URINE DRAINAGE 2000ML ANTI RFLX LF

## (undated) DEVICE — CATH URETERAL 5FR X 70 CM FLEX TIP POLYUR BARD

## (undated) DEVICE — CATH FOLEY 22FR 5ML 2 WAY SILICONE ELASTIMER

## (undated) DEVICE — SPECIMEN CONTAINER STERILE PEEL PACK

## (undated) DEVICE — CATH FOLEY COUNCIL 22FR 5ML 2 WAY LUBRICATH

## (undated) DEVICE — BLADE COLD STRAIGHT STRL

## (undated) DEVICE — LUBRICANT SURGILUBE TUBE 4 OZ  FLIP TOP

## (undated) DEVICE — STERILE CYSTO PACK: Brand: CARDINAL HEALTH

## (undated) DEVICE — CATH SECURE FOLEY

## (undated) DEVICE — GLOVE INDICATOR PI UNDERGLOVE SZ 8 BLUE

## (undated) DEVICE — PACK TUR

## (undated) DEVICE — SPONGE PVP SCRUB WING STERILE